# Patient Record
Sex: FEMALE | Race: WHITE | HISPANIC OR LATINO | Employment: FULL TIME | URBAN - METROPOLITAN AREA
[De-identification: names, ages, dates, MRNs, and addresses within clinical notes are randomized per-mention and may not be internally consistent; named-entity substitution may affect disease eponyms.]

---

## 2017-07-05 RX ORDER — LEVOTHYROXINE SODIUM 150 MCG
TABLET ORAL
Qty: 15 TABLET | Refills: 0 | Status: SHIPPED | OUTPATIENT
Start: 2017-07-05 | End: 2018-08-22 | Stop reason: SDUPTHER

## 2017-07-10 RX ORDER — LEVOTHYROXINE SODIUM 150 MCG
150 TABLET ORAL DAILY
Qty: 30 TABLET | Refills: 0 | Status: SHIPPED | OUTPATIENT
Start: 2017-07-10 | End: 2017-07-26 | Stop reason: SDUPTHER

## 2017-07-25 ENCOUNTER — TELEPHONE (OUTPATIENT)
Dept: FAMILY MEDICINE CLINIC | Facility: CLINIC | Age: 47
End: 2017-07-25

## 2017-07-26 RX ORDER — LEVOTHYROXINE SODIUM 150 MCG
150 TABLET ORAL DAILY
Qty: 30 TABLET | Refills: 0 | Status: SHIPPED | OUTPATIENT
Start: 2017-07-26 | End: 2017-08-24

## 2017-07-26 RX ORDER — LEVOTHYROXINE SODIUM 150 MCG
TABLET ORAL
Qty: 15 TABLET | Refills: 0 | OUTPATIENT
Start: 2017-07-26

## 2017-08-24 ENCOUNTER — OFFICE VISIT (OUTPATIENT)
Dept: FAMILY MEDICINE CLINIC | Facility: CLINIC | Age: 47
End: 2017-08-24

## 2017-08-24 VITALS
OXYGEN SATURATION: 98 % | DIASTOLIC BLOOD PRESSURE: 88 MMHG | BODY MASS INDEX: 40.74 KG/M2 | HEART RATE: 80 BPM | TEMPERATURE: 98 F | SYSTOLIC BLOOD PRESSURE: 140 MMHG | WEIGHT: 230 LBS

## 2017-08-24 DIAGNOSIS — R03.0 PREHYPERTENSION: ICD-10-CM

## 2017-08-24 DIAGNOSIS — E78.2 MIXED HYPERLIPIDEMIA: ICD-10-CM

## 2017-08-24 DIAGNOSIS — E03.9 ADULT HYPOTHYROIDISM: Primary | ICD-10-CM

## 2017-08-24 DIAGNOSIS — J30.1 ALLERGIC RHINITIS DUE TO POLLEN, UNSPECIFIED RHINITIS SEASONALITY: ICD-10-CM

## 2017-08-24 PROCEDURE — 99214 OFFICE O/P EST MOD 30 MIN: CPT | Performed by: FAMILY MEDICINE

## 2017-08-24 RX ORDER — FEXOFENADINE HCL 180 MG/1
180 TABLET ORAL DAILY
Qty: 30 TABLET | Refills: 11 | Status: SHIPPED | OUTPATIENT
Start: 2017-08-24 | End: 2018-09-27 | Stop reason: SDUPTHER

## 2017-08-24 NOTE — PROGRESS NOTES
Subjective   Liz Salvador is a 47 y.o. female. Presents today for   Chief Complaint   Patient presents with   • Follow-up     med check and thyroid check and blood pressures running high.   • Hypothyroidism   • Hyperlipidemia     Here for chronic disease management and above.    Hypothyroidism   This is a chronic problem. The current episode started more than 1 year ago. The problem occurs constantly. The problem has been unchanged. Associated symptoms include congestion and coughing (in am). Pertinent negatives include no abdominal pain, change in bowel habit, chest pain, fatigue, headaches, myalgias, nausea, numbness, visual change, vomiting or weakness. Associated symptoms comments: Paitent BP borderline at ov;  On medication remote past, but too low;  Reports bad experience at hospital when child and very anxious when sees physician (white coat syndrome).  Denies family hx of high blood pressure.    Reports that anytime eats has to go to restroom since choley.. Nothing aggravates the symptoms. Treatments tried: levothyroxine. Improvement on treatment: Due for recheck.   Hyperlipidemia   This is a chronic problem. The current episode started more than 1 year ago. Condition status: due for recheck, not on tx. Recent lipid tests were reviewed and are high. Exacerbating diseases include hypothyroidism. There are no known factors aggravating her hyperlipidemia. Pertinent negatives include no chest pain, focal sensory loss, focal weakness, leg pain, myalgias or shortness of breath. Current antihyperlipidemic treatment includes diet change. Improvement on treatment: due for recheck. There are no compliance problems.  Risk factors for coronary artery disease include dyslipidemia and obesity (hypothyroidism).     Hx of allergies, needs refills as starting back up.  This past May had episode of vertigo, has resolved;       Review of Systems   Constitutional: Negative for fatigue. Unexpected weight change: Weight  gain or loss.   HENT: Positive for congestion and sneezing.    Eyes: Positive for itching.   Respiratory: Positive for cough (in am). Negative for shortness of breath and wheezing.    Cardiovascular: Negative for chest pain, palpitations and leg swelling.   Gastrointestinal: Positive for diarrhea. Negative for abdominal pain, change in bowel habit, nausea and vomiting.   Musculoskeletal: Negative for myalgias.   Allergic/Immunologic: Positive for environmental allergies.   Neurological: Negative for dizziness, tremors, focal weakness, syncope, facial asymmetry, speech difficulty, weakness, light-headedness, numbness and headaches.       The following portions of the patient's history were reviewed and updated as appropriate: allergies, current medications, past family history, past medical history and problem list.    Patient Active Problem List   Diagnosis   • Adult hypothyroidism   • Vitamin D deficiency   • Mixed hyperlipidemia   • Prehypertension       Allergies   Allergen Reactions   • No Known Drug Allergy        Current Outpatient Prescriptions on File Prior to Visit   Medication Sig Dispense Refill   • azelastine (ASTELIN) 0.1 % nasal spray 2 sprays into each nostril 2 (two) times a day. Use in each nostril as directed 30 mL 12   • SYNTHROID 150 MCG tablet TAKE ONE TABLET BY MOUTH DAILY AS DIRECTED 15 tablet 0   • [DISCONTINUED] fexofenadine (ALLEGRA) 180 MG tablet Take 1 tablet by mouth daily. 30 tablet 11   • [DISCONTINUED] SYNTHROID 150 MCG tablet Take 1 tablet by mouth Daily. - Due for an appt 30 tablet 0     No current facility-administered medications on file prior to visit.        Objective   Vitals:    08/24/17 0848   BP: 140/88   Pulse: 80   Temp: 98 °F (36.7 °C)   SpO2: 98%   Weight: 230 lb (104 kg)       Physical Exam   Constitutional: She appears well-developed and well-nourished.   HENT:   Head: Normocephalic and atraumatic.   Nose: Mucosal edema and rhinorrhea present.   Mouth/Throat: Uvula is  midline, oropharynx is clear and moist and mucous membranes are normal.   +PND   Neck: Neck supple. No JVD present. No thyromegaly present.   Cardiovascular: Normal rate, regular rhythm and normal heart sounds.  Exam reveals no gallop and no friction rub.    No murmur heard.  Pulmonary/Chest: Effort normal and breath sounds normal. No respiratory distress. She has no wheezes. She has no rales.   Abdominal: Soft. Bowel sounds are normal. She exhibits no distension. There is no tenderness. There is no rebound and no guarding.   Musculoskeletal: She exhibits no edema.   Neurological: She is alert.   Skin: Skin is warm and dry.   Psychiatric: She has a normal mood and affect. Her behavior is normal.   Nursing note and vitals reviewed.      Assessment/Plan   Liz was seen today for follow-up, hypothyroidism and hyperlipidemia.    Diagnoses and all orders for this visit:    Adult hypothyroidism  -     TSH  -     T4, Free  -     T3, Free    Allergic rhinitis due to pollen, unspecified rhinitis seasonality  -     fexofenadine (ALLEGRA) 180 MG tablet; Take 1 tablet by mouth Daily.    Mixed hyperlipidemia  -     Comprehensive Metabolic Panel  -     Lipid Panel    Prehypertension    -bp borderline - recommend lifestyle changes, follow low Na diet, she is avoiding salt;  Recommend regular exercise and heart healthy diet.  Keep BP log and check once daily  -HLD - crecheck lipids.  -allergies - restart allergy medications;  Using flonase daily  -hypothyroidism - continue medication, recheck thyroid labs.           -Follow up: 6 months       Current Outpatient Prescriptions:   •  azelastine (ASTELIN) 0.1 % nasal spray, 2 sprays into each nostril 2 (two) times a day. Use in each nostril as directed, Disp: 30 mL, Rfl: 12  •  fexofenadine (ALLEGRA) 180 MG tablet, Take 1 tablet by mouth Daily., Disp: 30 tablet, Rfl: 11  •  SYNTHROID 150 MCG tablet, TAKE ONE TABLET BY MOUTH DAILY AS DIRECTED, Disp: 15 tablet, Rfl: 0

## 2017-08-25 LAB
ALBUMIN SERPL-MCNC: 4.3 G/DL (ref 3.5–5.2)
ALBUMIN/GLOB SERPL: 1.4 G/DL
ALP SERPL-CCNC: 116 U/L (ref 39–117)
ALT SERPL-CCNC: 14 U/L (ref 1–33)
AST SERPL-CCNC: 11 U/L (ref 1–32)
BILIRUB SERPL-MCNC: 0.6 MG/DL (ref 0.1–1.2)
BUN SERPL-MCNC: 11 MG/DL (ref 6–20)
BUN/CREAT SERPL: 12.6 (ref 7–25)
CALCIUM SERPL-MCNC: 9.8 MG/DL (ref 8.6–10.5)
CHLORIDE SERPL-SCNC: 102 MMOL/L (ref 98–107)
CHOLEST SERPL-MCNC: 242 MG/DL (ref 0–200)
CO2 SERPL-SCNC: 27.1 MMOL/L (ref 22–29)
CREAT SERPL-MCNC: 0.87 MG/DL (ref 0.57–1)
GLOBULIN SER CALC-MCNC: 3.1 GM/DL
GLUCOSE SERPL-MCNC: 101 MG/DL (ref 65–99)
HDLC SERPL-MCNC: 43 MG/DL (ref 40–60)
LDLC SERPL CALC-MCNC: 156 MG/DL (ref 0–100)
POTASSIUM SERPL-SCNC: 4.7 MMOL/L (ref 3.5–5.2)
PROT SERPL-MCNC: 7.4 G/DL (ref 6–8.5)
SODIUM SERPL-SCNC: 143 MMOL/L (ref 136–145)
T3FREE SERPL-MCNC: 2.7 PG/ML (ref 2–4.4)
T4 FREE SERPL-MCNC: 1.37 NG/DL (ref 0.93–1.7)
TRIGL SERPL-MCNC: 215 MG/DL (ref 0–150)
TSH SERPL DL<=0.005 MIU/L-ACNC: 1.54 MIU/ML (ref 0.27–4.2)
VLDLC SERPL CALC-MCNC: 43 MG/DL (ref 5–40)

## 2017-08-27 NOTE — PROGRESS NOTES
Call and mail copy of results to patient.  Thyroid appropriate range  Kidney and liver function normal  Choleterol mildly elevated, work on diet

## 2017-09-06 DIAGNOSIS — J30.1 ALLERGIC RHINITIS DUE TO POLLEN: ICD-10-CM

## 2017-09-07 RX ORDER — FEXOFENADINE HCL 180 MG/1
TABLET ORAL
Qty: 30 TABLET | Refills: 10 | Status: SHIPPED | OUTPATIENT
Start: 2017-09-07 | End: 2018-08-30 | Stop reason: SDUPTHER

## 2017-09-15 ENCOUNTER — OFFICE VISIT (OUTPATIENT)
Dept: FAMILY MEDICINE CLINIC | Facility: CLINIC | Age: 47
End: 2017-09-15

## 2017-09-15 VITALS
OXYGEN SATURATION: 99 % | WEIGHT: 229 LBS | DIASTOLIC BLOOD PRESSURE: 82 MMHG | HEART RATE: 109 BPM | HEIGHT: 63 IN | TEMPERATURE: 97.7 F | BODY MASS INDEX: 40.57 KG/M2 | SYSTOLIC BLOOD PRESSURE: 132 MMHG

## 2017-09-15 DIAGNOSIS — M25.561 ACUTE PAIN OF RIGHT KNEE: Primary | ICD-10-CM

## 2017-09-15 PROCEDURE — 99213 OFFICE O/P EST LOW 20 MIN: CPT | Performed by: NURSE PRACTITIONER

## 2017-09-15 RX ORDER — DICLOFENAC SODIUM 75 MG/1
75 TABLET, DELAYED RELEASE ORAL 2 TIMES DAILY
Qty: 60 TABLET | Refills: 1 | Status: SHIPPED | OUTPATIENT
Start: 2017-09-15 | End: 2018-08-30

## 2017-09-15 NOTE — PROGRESS NOTES
Subjective   Liz Salvador is a 47 y.o. female. She has R knee pain and edema and states yesterday she could not put weight on her leg.  She has been taking diclofenac 75 mg that she was given in the past and does think it's a little better today. She was very active in sports and gymnastics when she was younger. She always has trouble with her knees but most of the time it lasts only a couple of days and then goes away. She noticed that on Wed the right one was really getting bad. She was not even about to bear weight to walk without something to lean on. It's never been this bad before. She has no known injury but has been working a lot lately standing on concrete floors, twisting and carrying heavy boxes. Thinks this might have aggravated the knee pain. She knows that having excess weight is not helping the problems but is working on that. She does try to be active by walking, hiking etc.     Knee Pain    The incident occurred more than 1 week ago. The incident occurred at home. There was no injury mechanism. The pain is present in the right knee. Associated symptoms include an inability to bear weight. The symptoms are aggravated by weight bearing. She has tried NSAIDs and ice for the symptoms. The treatment provided moderate relief.        The following portions of the patient's history were reviewed and updated as appropriate: allergies, current medications, past medical history, past social history, past surgical history and problem list.    Review of Systems   Constitutional: Negative.    Respiratory: Negative.    Cardiovascular: Negative.    Musculoskeletal: Positive for arthralgias and gait problem.       Objective   Physical Exam   Constitutional: Vital signs are normal. She appears well-developed and well-nourished. She is cooperative.   Cardiovascular: Normal rate, regular rhythm and normal heart sounds.    Pulmonary/Chest: Effort normal and breath sounds normal.   Musculoskeletal:        Right  "knee: She exhibits decreased range of motion, swelling and effusion. She exhibits no LCL laxity, normal patellar mobility, normal meniscus and no MCL laxity. No medial joint line, no lateral joint line, no MCL and no LCL tenderness noted.   Neurological: She is alert.   Nursing note and vitals reviewed.    Vitals:    09/15/17 1128   BP: 132/82   Pulse: 109   Temp: 97.7 °F (36.5 °C)   TempSrc: Oral   SpO2: 99%   Weight: 229 lb (104 kg)   Height: 63\" (160 cm)       Assessment/Plan   Diagnoses and all orders for this visit:    Acute pain of right knee  -     diclofenac (VOLTAREN) 75 MG EC tablet; Take 1 tablet by mouth 2 (Two) Times a Day.    Ice to knees  Quad strengthening exercises. Start with sets of 10 and advance to 15 TID. No running, careful with hiking. No treadmill but elipitcal ok. Swimming good.  NSAID's x 2 weeks. If not improved will get MRI.  RTC if worse           "

## 2017-09-26 RX ORDER — LEVOTHYROXINE SODIUM 150 MCG
150 TABLET ORAL DAILY
Qty: 30 TABLET | Refills: 5 | Status: SHIPPED | OUTPATIENT
Start: 2017-09-26 | End: 2018-04-04 | Stop reason: SDUPTHER

## 2018-04-04 RX ORDER — LEVOTHYROXINE SODIUM 150 MCG
TABLET ORAL
Qty: 30 TABLET | Refills: 4 | Status: SHIPPED | OUTPATIENT
Start: 2018-04-04 | End: 2018-08-30 | Stop reason: SDUPTHER

## 2018-08-17 RX ORDER — LEVOTHYROXINE SODIUM 150 MCG
TABLET ORAL
Qty: 22 TABLET | Refills: 3 | OUTPATIENT
Start: 2018-08-17

## 2018-08-22 ENCOUNTER — TELEPHONE (OUTPATIENT)
Dept: FAMILY MEDICINE CLINIC | Facility: CLINIC | Age: 48
End: 2018-08-22

## 2018-08-22 RX ORDER — LEVOTHYROXINE SODIUM 150 MCG
150 TABLET ORAL DAILY
Qty: 30 TABLET | Refills: 0 | Status: SHIPPED | OUTPATIENT
Start: 2018-08-22 | End: 2018-09-17 | Stop reason: SDUPTHER

## 2018-08-30 ENCOUNTER — OFFICE VISIT (OUTPATIENT)
Dept: FAMILY MEDICINE CLINIC | Facility: CLINIC | Age: 48
End: 2018-08-30

## 2018-08-30 VITALS
SYSTOLIC BLOOD PRESSURE: 128 MMHG | DIASTOLIC BLOOD PRESSURE: 86 MMHG | HEIGHT: 63 IN | WEIGHT: 232 LBS | TEMPERATURE: 98 F | OXYGEN SATURATION: 99 % | BODY MASS INDEX: 41.11 KG/M2 | HEART RATE: 82 BPM

## 2018-08-30 DIAGNOSIS — E03.9 ADULT HYPOTHYROIDISM: ICD-10-CM

## 2018-08-30 DIAGNOSIS — R03.0 PREHYPERTENSION: ICD-10-CM

## 2018-08-30 DIAGNOSIS — E78.2 MIXED HYPERLIPIDEMIA: Primary | ICD-10-CM

## 2018-08-30 PROCEDURE — 99213 OFFICE O/P EST LOW 20 MIN: CPT | Performed by: FAMILY MEDICINE

## 2018-08-30 RX ORDER — FLUTICASONE PROPIONATE 50 MCG
2 SPRAY, SUSPENSION (ML) NASAL DAILY
COMMUNITY
End: 2019-02-07

## 2018-08-30 NOTE — PROGRESS NOTES
Subjective   Liz Salvador is a 48 y.o. female. Presents today for   Chief Complaint   Patient presents with   • Hypothyroidism     pt here for med review and labs       Hypothyroidism   This is a chronic problem. The current episode started more than 1 year ago. The problem occurs constantly. Pertinent negatives include no abdominal pain, change in bowel habit, chest pain or fatigue. Nothing aggravates the symptoms. Treatments tried: on levothyroxine. The treatment provided moderate relief.     Hx of borderline bp;   Lipids elevated, due for recheck.    Having allergies. Hx of vertigo related to ETD, doing ok on allergy medication.    Sees Gynecology for well woman exams;  Last period over 1 year now.  Had c-scope completed.    Review of Systems   Constitutional: Negative for fatigue.   Cardiovascular: Negative for chest pain.   Gastrointestinal: Negative for abdominal pain and change in bowel habit.       Patient Active Problem List   Diagnosis   • Adult hypothyroidism   • Vitamin D deficiency   • Mixed hyperlipidemia   • Prehypertension       Social History     Social History   • Marital status:      Social History Main Topics   • Smoking status: Never Smoker   • Smokeless tobacco: Never Used   • Alcohol use No   • Drug use: No     Other Topics Concern   • Not on file       Allergies   Allergen Reactions   • No Known Drug Allergy        Current Outpatient Prescriptions on File Prior to Visit   Medication Sig Dispense Refill   • fexofenadine (ALLEGRA) 180 MG tablet Take 1 tablet by mouth Daily. 30 tablet 11   • SYNTHROID 150 MCG tablet Take 1 tablet by mouth Daily. as directed 30 tablet 0   • [DISCONTINUED] azelastine (ASTELIN) 0.1 % nasal spray 2 sprays into each nostril 2 (two) times a day. Use in each nostril as directed 30 mL 12   • [DISCONTINUED] diclofenac (VOLTAREN) 75 MG EC tablet Take 1 tablet by mouth 2 (Two) Times a Day. 60 tablet 1   • [DISCONTINUED] fexofenadine (ALLEGRA) 180 MG tablet  "TAKE ONE TABLET BY MOUTH DAILY 30 tablet 10   • [DISCONTINUED] SYNTHROID 150 MCG tablet TAKE ONE TABLET BY MOUTH DAILY 30 tablet 4     No current facility-administered medications on file prior to visit.        Objective   Vitals:    08/30/18 1651   BP: 128/86   BP Location: Right arm   Patient Position: Sitting   Cuff Size: Large Adult   Pulse: 82   Temp: 98 °F (36.7 °C)   TempSrc: Oral   SpO2: 99%   Weight: 105 kg (232 lb)   Height: 160 cm (62.99\")       Physical Exam   Constitutional: She appears well-developed and well-nourished.   HENT:   Head: Normocephalic and atraumatic.   Right Ear: Tympanic membrane and external ear normal.   Left Ear: Tympanic membrane and external ear normal.   Nose: Nose normal.   Mouth/Throat: Oropharynx is clear and moist.   Neck: Neck supple. No JVD present. No thyromegaly present.   Cardiovascular: Normal rate, regular rhythm and normal heart sounds.  Exam reveals no gallop and no friction rub.    No murmur heard.  Pulmonary/Chest: Effort normal and breath sounds normal. No respiratory distress. She has no wheezes. She has no rales.   Abdominal: Soft. Bowel sounds are normal. She exhibits no distension. There is no tenderness. There is no rebound and no guarding.   Musculoskeletal: She exhibits no edema.   Neurological: She is alert.   Skin: Skin is warm and dry.   Psychiatric: She has a normal mood and affect. Her behavior is normal.   Nursing note and vitals reviewed.      Assessment/Plan   Liz was seen today for hypothyroidism.    Diagnoses and all orders for this visit:    Mixed hyperlipidemia  -     Comprehensive Metabolic Panel  -     Lipid Panel  -     TSH    Adult hypothyroidism  -     Comprehensive Metabolic Panel  -     Lipid Panel  -     TSH    Prehypertension  -     Comprehensive Metabolic Panel  -     Lipid Panel  -     TSH    -hypothyroidism - continue medication, recheck thyroid labs.             -Follow up: 12 months and prn  "

## 2018-09-05 DIAGNOSIS — E03.9 ADULT HYPOTHYROIDISM: Primary | ICD-10-CM

## 2018-09-17 ENCOUNTER — TELEPHONE (OUTPATIENT)
Dept: FAMILY MEDICINE CLINIC | Facility: CLINIC | Age: 48
End: 2018-09-17

## 2018-09-17 RX ORDER — LEVOTHYROXINE SODIUM 150 MCG
TABLET ORAL
Qty: 30 TABLET | Refills: 6 | Status: SHIPPED | OUTPATIENT
Start: 2018-09-17 | End: 2018-10-25 | Stop reason: SDUPTHER

## 2018-09-27 ENCOUNTER — TELEPHONE (OUTPATIENT)
Dept: FAMILY MEDICINE CLINIC | Facility: CLINIC | Age: 48
End: 2018-09-27

## 2018-09-27 DIAGNOSIS — J30.1 ALLERGIC RHINITIS DUE TO POLLEN: ICD-10-CM

## 2018-09-27 RX ORDER — FEXOFENADINE HCL 180 MG/1
TABLET ORAL
Qty: 30 TABLET | Refills: 5 | Status: SHIPPED | OUTPATIENT
Start: 2018-09-27 | End: 2018-09-27 | Stop reason: SDUPTHER

## 2018-09-27 RX ORDER — FEXOFENADINE HCL 180 MG/1
180 TABLET ORAL DAILY
Qty: 30 TABLET | Refills: 5 | Status: SHIPPED | OUTPATIENT
Start: 2018-09-27

## 2018-09-28 LAB
T3FREE SERPL-MCNC: 2.4 PG/ML (ref 2–4.4)
T4 FREE SERPL-MCNC: 1.49 NG/DL (ref 0.93–1.7)
TSH SERPL DL<=0.005 MIU/L-ACNC: 0.44 MIU/ML (ref 0.27–4.2)

## 2018-10-25 ENCOUNTER — TELEPHONE (OUTPATIENT)
Dept: FAMILY MEDICINE CLINIC | Facility: CLINIC | Age: 48
End: 2018-10-25

## 2018-10-25 RX ORDER — LEVOTHYROXINE SODIUM 150 MCG
150 TABLET ORAL DAILY
Qty: 30 TABLET | Refills: 6 | Status: SHIPPED | OUTPATIENT
Start: 2018-10-25 | End: 2019-05-27 | Stop reason: SDUPTHER

## 2019-02-07 ENCOUNTER — OFFICE VISIT (OUTPATIENT)
Dept: FAMILY MEDICINE CLINIC | Facility: CLINIC | Age: 49
End: 2019-02-07

## 2019-02-07 VITALS
BODY MASS INDEX: 40.57 KG/M2 | HEART RATE: 84 BPM | SYSTOLIC BLOOD PRESSURE: 148 MMHG | HEIGHT: 63 IN | DIASTOLIC BLOOD PRESSURE: 84 MMHG | TEMPERATURE: 98.1 F | OXYGEN SATURATION: 98 % | WEIGHT: 229 LBS

## 2019-02-07 DIAGNOSIS — M54.41 ACUTE RIGHT-SIDED LOW BACK PAIN WITH RIGHT-SIDED SCIATICA: Primary | ICD-10-CM

## 2019-02-07 PROCEDURE — 99213 OFFICE O/P EST LOW 20 MIN: CPT | Performed by: NURSE PRACTITIONER

## 2019-02-07 RX ORDER — NAPROXEN 500 MG/1
TABLET ORAL
Refills: 0 | COMMUNITY
Start: 2019-02-03 | End: 2019-09-03

## 2019-02-07 RX ORDER — METHYLPREDNISOLONE 4 MG/1
TABLET ORAL
Qty: 21 TABLET | Refills: 0 | Status: SHIPPED | OUTPATIENT
Start: 2019-02-07 | End: 2019-09-03

## 2019-02-07 RX ORDER — METHOCARBAMOL 500 MG/1
TABLET, FILM COATED ORAL
Refills: 0 | COMMUNITY
Start: 2019-02-03 | End: 2019-09-03

## 2019-02-07 NOTE — PROGRESS NOTES
"Subjective   Liz Salvador is a 48 y.o. female who presents for a follow up on pain in low back. Went to [facility] 2/3/19 and was dx with sciatica. Pain worsened and as a result was treated in ER the same night. Has completed steroids and is taking methocarbamol/naproxen.     History of Present Illness   At onset of flare reports low back pain was associated with stiffness after sitting on a hard bleacher. Went for a nap and woke with severe R low back pain and pain into the R upper leg radiating to the knee. Initially could not stand straight. Got 2 shots at [facility], not that helpful. So went to ER, was given muscle relaxer and got relief (robaxin). Finished steroids 2 days ago, and gradual return of RLE abnormal sensation. Today the numbness a little better but R outer hip is hurting. Frustrated with level of pain. Was using heat, not much relief, but cold really did help.     Does admit to a history of R sided low back pain flares, but never with sciatica before. These would typically settle with moving around, NSAIDS, none sustained.   The following portions of the patient's history were reviewed and updated as appropriate: allergies, current medications, past family history, past medical history, past social history, past surgical history and problem list.    Review of Systems   Constitutional: Positive for activity change. Negative for chills and fever.   Gastrointestinal: Negative for constipation (no bowel or bladder changes) and diarrhea.   Genitourinary: Negative for urinary incontinence.   Musculoskeletal: Positive for arthralgias and back pain. Negative for gait problem.   Neurological: Negative for weakness and numbness.   Psychiatric/Behavioral: Negative.      /84   Pulse 84   Temp 98.1 °F (36.7 °C) (Oral)   Ht 160 cm (62.99\")   Wt 104 kg (229 lb)   LMP 02/14/2016   SpO2 98%   BMI 40.58 kg/m²     Objective   Physical Exam   Constitutional: She appears well-developed and well-nourished. No " distress.   Musculoskeletal:        Right hip: She exhibits tenderness (lateral hip).        Lumbar back: She exhibits pain and spasm. She exhibits normal range of motion and no tenderness.   SLR neg reginaldo, -SI tenderness, negative piriformis to distraction.    Neurological: No sensory deficit. She exhibits normal muscle tone. Gait normal.   Reflex Scores:       Patellar reflexes are 2+ on the right side and 2+ on the left side.       Achilles reflexes are 2+ on the right side and 2+ on the left side.  Psychiatric: She has a normal mood and affect. Her speech is normal and behavior is normal.     Assessment/Plan   Problems Addressed this Visit     None      Visit Diagnoses     Acute right-sided low back pain with right-sided sciatica    -  Primary    Relevant Medications    MethylPREDNISolone (MEDROL) 4 MG tablet    Other Relevant Orders    Ambulatory Referral to Physical Therapy Evaluate and treat        Low back pain--sciatica still flared, worsening of steroids, will rx medrol dose pack, continue robaxin for pain. HEP given, will start PT, consider imaging if not settling in 6 weeks.

## 2019-02-08 ENCOUNTER — TELEPHONE (OUTPATIENT)
Dept: FAMILY MEDICINE CLINIC | Facility: CLINIC | Age: 49
End: 2019-02-08

## 2019-02-12 ENCOUNTER — TREATMENT (OUTPATIENT)
Dept: PHYSICAL THERAPY | Facility: CLINIC | Age: 49
End: 2019-02-12

## 2019-02-12 DIAGNOSIS — M54.41 ACUTE RIGHT-SIDED LOW BACK PAIN WITH RIGHT-SIDED SCIATICA: Primary | ICD-10-CM

## 2019-02-12 PROCEDURE — 97530 THERAPEUTIC ACTIVITIES: CPT | Performed by: PHYSICAL THERAPIST

## 2019-02-12 PROCEDURE — 97161 PT EVAL LOW COMPLEX 20 MIN: CPT | Performed by: PHYSICAL THERAPIST

## 2019-02-12 PROCEDURE — 97110 THERAPEUTIC EXERCISES: CPT | Performed by: PHYSICAL THERAPIST

## 2019-02-12 NOTE — PATIENT INSTRUCTIONS
Educated about Dx and exam findings, rationale and POC. Gave handout on HEP with instructions.  Reviewed seated and sleeping postures

## 2019-02-12 NOTE — PROGRESS NOTES
Physical Therapy Initial Evaluation and Plan of Care        Subjective Evaluation    History of Present Illness  Date of onset: 2019  Mechanism of injury: Sat on hard bleachers for hours - back flared up and also sleeping on a bad mattress; Hx of prior off/on LBP with some RLE sxs; steroid shot helped initially but wore off and now on dose pack which is helping      Patient Occupation: warehouse work and purchasing mgr   Precautions and Work Restrictions: off for a week - today 1st day backPain  Current pain ratin  At best pain ratin  At worst pain rating: 10  Location: lower R back/hip and R medial thigh; some N/T ant thigh  Quality: dull ache  Relieving factors: medications and ice (pressure)  Aggravating factors: prolonged positioning and sleeping (sitting on a hard chair)  Progression: improved    Social Support  Lives in: multiple-level home  Lives with: parents    Diagnostic Tests  No diagnostic tests performed    Treatments  Previous treatment: injection treatment and medication  Current treatment: medication  Patient Goals  Patient goals for therapy: decreased pain  Patient goal: be able to sit in chairs           Objective       Postural Observations    Additional Postural Observation Details  Unremarkable; non-antalgic gait    Tenderness     Right Hip   Tenderness in the PSIS.     Neurological Testing     Sensation     Lumbar   Left   Intact: light touch    Right   Intact: light touch    Active Range of Motion     Additional Active Range of Motion Details  All WFL without pain except mild ant thigh pain with RSB and R ext    Strength/Myotome Testing     Lumbar   Left   Normal strength  Heel walk: normal  Toe walk: normal    Right   Normal strength  Heel walk: normal  Toe walk: normal    Additional Strength Details  Moderate core weakness - abs 4-/5    Tests     Lumbar   Negative repeated extension and repeated flexion.     Left   Negative passive SLR.     Right   Positive quadrant.   Negative  passive SLR.     Additional Tests Details  + R ext quadrant for thigh pain; neg Everardo; neg hip scour; neg PA glides; no notable mm tightness         Assessment & Plan     Assessment  Impairments: abnormal or restricted ROM, activity intolerance, impaired physical strength and pain with function  Assessment details: 47 yo F with acute onset R side back/hip pain with radiating sxs into thigh presents with moderate core weakness and signs/sxs strain with S/I and/or nerve compression sxs but without obvious radiculopathy.  Prognosis: good  Functional Limitations: sleeping, uncomfortable because of pain, sitting and stooping  Goals  Plan Goals: STGs (2 wks)    1. Patient demonstrates lópez for and  compliance with HEP and precautions/restrictions  2. Patient reports pain decreased to 5/10 for improved ADLs  3. Patient reports decreased sxs into RLE by 50-75%  4. Review body mechanics with ADLs/work tasks    LTGs (4 wks)    1. Patient demonstrates independence with HEP and body mechanics for prevention  2. AROM restored to pre-injury levels with min to no pain  3. Patient reports decreased sxs into RLE by %  4. Patient demonstrates ability to sit for 30 min and sleep 4 hrs to allow return to ADLs/work without restriction    Plan  Therapy options: will be seen for skilled physical therapy services  Planned modality interventions: cryotherapy, electrical stimulation/[place] stimulation, ultrasound and thermotherapy (hydrocollator packs)  Planned therapy interventions: abdominal trunk stabilization, body mechanics training, home exercise program, therapeutic activities, stretching and strengthening  Frequency: 1-2 x wk.  Duration in weeks: 4  Treatment plan discussed with: patient        Manual Therapy:    0     mins  83314;  Therapeutic Exercise:    16     mins  [id number];     Neuromuscular Brian:    0    mins  53946;    Therapeutic Activity:     10     mins  48544;         Timed Treatment:   26   mins   Total Treatment:      47   mins    PT SIGNATURE: Love Dover, PT   DATE TREATMENT INITIATED: 2/12/2019    Initial Certification  Certification Period: 5/13/2019  I certify that the therapy services are furnished while this patient is under my care.  The services outlined above are required by this patient, and will be reviewed every 90 days.     PHYSICIAN: Robyn Palacios, APRN      DATE:     Please sign and return via fax to [phone].. Thank you, [facility].

## 2019-02-21 ENCOUNTER — TREATMENT (OUTPATIENT)
Dept: PHYSICAL THERAPY | Facility: CLINIC | Age: 49
End: 2019-02-21

## 2019-02-21 DIAGNOSIS — M54.41 ACUTE RIGHT-SIDED LOW BACK PAIN WITH RIGHT-SIDED SCIATICA: Primary | ICD-10-CM

## 2019-02-21 PROCEDURE — 97110 THERAPEUTIC EXERCISES: CPT | Performed by: PHYSICAL THERAPIST

## 2019-02-21 PROCEDURE — 97530 THERAPEUTIC ACTIVITIES: CPT | Performed by: PHYSICAL THERAPIST

## 2019-02-21 NOTE — PROGRESS NOTES
Physical Therapy Daily Progress Note      Visit # 2      Subjective   Back is better but stopped exercises because having shooting pain down side of R thigh but not during ex.  Notice it with reaching up/back (extending) and with driving    Objective   See Exercise, Manual, and Modality Logs for complete treatment.       Assessment/Plan    Overall improved with increased lópez for ADLs with adaptation of ergonomic suggestions. N o c/o LE sxs with ex and reported no pain at end of session.    Re-Assess in 2 wks and proceed accordingly             Manual Therapy:    0     mins  36149;  Therapeutic Exercise:    24     mins  96750;     Neuromuscular Brian:    0    mins  61368;    Therapeutic Activity:     14     mins  47073;         Timed Treatment:   38   mins   Total Treatment:     38   mins    Love Dover PT  Physical Therapist

## 2019-05-28 RX ORDER — LEVOTHYROXINE SODIUM 150 MCG
150 TABLET ORAL DAILY
Qty: 30 TABLET | Refills: 0 | Status: SHIPPED | OUTPATIENT
Start: 2019-05-28 | End: 2019-06-24 | Stop reason: SDUPTHER

## 2019-06-24 RX ORDER — LEVOTHYROXINE SODIUM 150 MCG
150 TABLET ORAL DAILY
Qty: 30 TABLET | Refills: 1 | Status: SHIPPED | OUTPATIENT
Start: 2019-06-24 | End: 2019-08-14 | Stop reason: SDUPTHER

## 2019-08-14 ENCOUNTER — TELEPHONE (OUTPATIENT)
Dept: FAMILY MEDICINE CLINIC | Facility: CLINIC | Age: 49
End: 2019-08-14

## 2019-08-14 RX ORDER — LEVOTHYROXINE SODIUM 150 MCG
150 TABLET ORAL DAILY
Qty: 30 TABLET | Refills: 1 | Status: SHIPPED | OUTPATIENT
Start: 2019-08-14 | End: 2019-09-03 | Stop reason: SDUPTHER

## 2019-08-26 RX ORDER — LEVOTHYROXINE SODIUM 150 MCG
150 TABLET ORAL DAILY
Qty: 30 TABLET | Refills: 0 | Status: SHIPPED | OUTPATIENT
Start: 2019-08-26 | End: 2019-09-03 | Stop reason: SDUPTHER

## 2019-09-03 ENCOUNTER — OFFICE VISIT (OUTPATIENT)
Dept: FAMILY MEDICINE CLINIC | Facility: CLINIC | Age: 49
End: 2019-09-03

## 2019-09-03 VITALS
HEIGHT: 62 IN | OXYGEN SATURATION: 97 % | BODY MASS INDEX: 42.69 KG/M2 | HEART RATE: 94 BPM | DIASTOLIC BLOOD PRESSURE: 80 MMHG | SYSTOLIC BLOOD PRESSURE: 110 MMHG | WEIGHT: 232 LBS

## 2019-09-03 DIAGNOSIS — E03.9 ADULT HYPOTHYROIDISM: Primary | ICD-10-CM

## 2019-09-03 DIAGNOSIS — M25.60 JOINT STIFFNESS: ICD-10-CM

## 2019-09-03 DIAGNOSIS — E55.9 VITAMIN D DEFICIENCY: ICD-10-CM

## 2019-09-03 DIAGNOSIS — E66.01 CLASS 3 SEVERE OBESITY DUE TO EXCESS CALORIES WITH SERIOUS COMORBIDITY AND BODY MASS INDEX (BMI) OF 40.0 TO 44.9 IN ADULT (HCC): ICD-10-CM

## 2019-09-03 DIAGNOSIS — M25.40 JOINT SWELLING: ICD-10-CM

## 2019-09-03 DIAGNOSIS — E78.2 MIXED HYPERLIPIDEMIA: ICD-10-CM

## 2019-09-03 PROCEDURE — 99214 OFFICE O/P EST MOD 30 MIN: CPT | Performed by: FAMILY MEDICINE

## 2019-09-03 RX ORDER — FLUTICASONE PROPIONATE 50 MCG
2 SPRAY, SUSPENSION (ML) NASAL DAILY
COMMUNITY

## 2019-09-03 RX ORDER — LEVOTHYROXINE SODIUM 150 MCG
150 TABLET ORAL DAILY
Qty: 30 TABLET | Refills: 12 | Status: SHIPPED | OUTPATIENT
Start: 2019-09-03 | End: 2020-08-21 | Stop reason: SDUPTHER

## 2019-09-03 NOTE — PROGRESS NOTES
Subjective   Liz Salvador is a 49 y.o. female. Presents today for   Chief Complaint   Patient presents with   • Hypothyroidism       Hypothyroidism   This is a chronic problem. The current episode started more than 1 year ago. The problem occurs constantly. The problem has been unchanged. Pertinent negatives include no abdominal pain, change in bowel habit or fatigue. Treatments tried: on brand only but expensive;   The treatment provided moderate (Due for full labs) relief.   Obesity   This is a chronic problem. The problem occurs constantly. The problem has been waxing and waning. Pertinent negatives include no abdominal pain, change in bowel habit or fatigue. Treatments tried: exercise and tried several diets;  Feels frustrated, loses a little than regains.     Reports knees, shoulders, wrists and ankles swell and painful;  Occly joint stiffness;  Family hx of RA in [place].  Was a gymnist.      Hx of elevated lipids and low vitamin D  Review of Systems   Constitutional: Negative for fatigue.   Gastrointestinal: Negative for abdominal pain and change in bowel habit.       Patient Active Problem List   Diagnosis   • Adult hypothyroidism   • Vitamin D deficiency   • Mixed hyperlipidemia   • Prehypertension       Social History     Socioeconomic History   • Marital status:      Spouse name: Not on file   • Number of children: Not on file   • Years of education: Not on file   • Highest education level: Not on file   Tobacco Use   • Smoking status: Never Smoker   • Smokeless tobacco: Never Used   Substance and Sexual Activity   • Alcohol use: No   • Drug use: No       Allergies   Allergen Reactions   • No Known Drug Allergy        Current Outpatient Medications on File Prior to Visit   Medication Sig Dispense Refill   • fluticasone (FLONASE) 50 MCG/ACT nasal spray 2 sprays into the nostril(s) as directed by provider Daily.     • SYNTHROID 150 MCG tablet Take 1 tablet by mouth Daily. as directed 30 tablet 1  "  • fexofenadine (ALLEGRA) 180 MG tablet Take 1 tablet by mouth Daily. 30 tablet 5   • [DISCONTINUED] methocarbamol (ROBAXIN) 500 MG tablet TK 2 TS PO Q 6 H PRF MUSCLE SPASM  0   • [DISCONTINUED] MethylPREDNISolone (MEDROL) 4 MG tablet follow package directions 21 tablet 0   • [DISCONTINUED] naproxen (NAPROSYN) 500 MG tablet TK 1 T PO BID WC FOR 5 DAYS  0   • [DISCONTINUED] SYNTHROID 150 MCG tablet TAKE 1 TABLET BY MOUTH DAILY AS DIRECTED 30 tablet 0     No current facility-administered medications on file prior to visit.        Objective   Vitals:    09/03/19 0859   BP: 110/80   BP Location: Left arm   Patient Position: Sitting   Cuff Size: Large Adult   Pulse: 94   SpO2: 97%   Weight: 105 kg (232 lb)   Height: 157.5 cm (62\")       Physical Exam   Constitutional: She appears well-developed and well-nourished.   HENT:   Head: Normocephalic and atraumatic.   Neck: Neck supple. No JVD present. No thyromegaly present.   Cardiovascular: Normal rate, regular rhythm and normal heart sounds. Exam reveals no gallop and no friction rub.   No murmur heard.  Pulmonary/Chest: Effort normal and breath sounds normal. No respiratory distress. She has no wheezes. She has no rales.   Abdominal: Soft. Bowel sounds are normal. She exhibits no distension. There is no tenderness. There is no rebound and no guarding.   Musculoskeletal: She exhibits no edema.        Right wrist: She exhibits swelling.        Left wrist: She exhibits swelling.   Neurological: She is alert.   Skin: Skin is warm and dry.   Psychiatric: She has a normal mood and affect. Her behavior is normal.   Nursing note and vitals reviewed.      Assessment/Plan   Liz was seen today for hypothyroidism.    Diagnoses and all orders for this visit:    Adult hypothyroidism  -     SYNTHROID 150 MCG tablet; Take 1 tablet by mouth Daily. as directed  -     Cancel: T4, Free  -     Cancel: T3, Free  -     Cancel: TSH  -     Cancel: Lipid Panel  -     TSH  -     T4, Free  -  "    T3, Free  -     C-reactive Protein  -     RC With / DsDNA, RNP, Sjogrens A / B, Smith  -     Rheumatoid Factor  -     Sedimentation Rate  -     Cyclic Citrul Peptide Antibody, IgG / IgA  -     CBC & Differential  -     Comprehensive Metabolic Panel  -     Lipid Panel    Vitamin D deficiency  -     Cancel: Vitamin D 25 Hydroxy  -     TSH  -     T4, Free  -     T3, Free  -     C-reactive Protein  -     RC With / DsDNA, RNP, Sjogrens A / B, Smith  -     Rheumatoid Factor  -     Sedimentation Rate  -     Cyclic Citrul Peptide Antibody, IgG / IgA  -     CBC & Differential  -     Comprehensive Metabolic Panel  -     Lipid Panel  -     Vitamin D 25 Hydroxy    Mixed hyperlipidemia  -     Cancel: Comprehensive Metabolic Panel  -     Cancel: Lipid Panel  -     TSH  -     T4, Free  -     T3, Free  -     C-reactive Protein  -     RC With / DsDNA, RNP, Sjogrens A / B, Smith  -     Rheumatoid Factor  -     Sedimentation Rate  -     Cyclic Citrul Peptide Antibody, IgG / IgA  -     CBC & Differential  -     Comprehensive Metabolic Panel  -     Lipid Panel    Joint stiffness  -     TSH  -     T4, Free  -     T3, Free  -     C-reactive Protein  -     RC With / DsDNA, RNP, Sjogrens A / B, Smith  -     Rheumatoid Factor  -     Sedimentation Rate  -     Cyclic Citrul Peptide Antibody, IgG / IgA  -     CBC & Differential  -     Comprehensive Metabolic Panel  -     Lipid Panel    Joint swelling  -     TSH  -     T4, Free  -     T3, Free  -     C-reactive Protein  -     RC With / DsDNA, RNP, Sjogrens A / B, Smith  -     Rheumatoid Factor  -     Sedimentation Rate  -     Cyclic Citrul Peptide Antibody, IgG / IgA  -     CBC & Differential  -     Comprehensive Metabolic Panel  -     Lipid Panel    Class 3 severe obesity due to excess calories with serious comorbidity and body mass index (BMI) of 40.0 to 44.9 in adult (CMS/[facility])        -runs to br after gb;  Try metamucil twice daily as stool bulking agent;    -will screen for RA,    -due to recheck vitamin D  -recheck lipids  -hypothyroidism - continue medication, recheck thyroid labs.    Spent >25 minutes with > 50% on counseling regarding diet, exercise and weight loss.       -Follow up: 12 months and prn

## 2019-09-05 LAB
25(OH)D3+25(OH)D2 SERPL-MCNC: 18.8 NG/ML (ref 30–100)
ALBUMIN SERPL-MCNC: 4.4 G/DL (ref 3.5–5.2)
ALBUMIN/GLOB SERPL: 1.6 G/DL
ALP SERPL-CCNC: 117 U/L (ref 39–117)
ALT SERPL-CCNC: 15 U/L (ref 1–33)
ANA SER QL: POSITIVE
AST SERPL-CCNC: 14 U/L (ref 1–32)
BASOPHILS # BLD AUTO: 0.05 10*3/MM3 (ref 0–0.2)
BASOPHILS NFR BLD AUTO: 0.8 % (ref 0–1.5)
BILIRUB SERPL-MCNC: 0.6 MG/DL (ref 0.2–1.2)
BUN SERPL-MCNC: 12 MG/DL (ref 6–20)
BUN/CREAT SERPL: 14.1 (ref 7–25)
CALCIUM SERPL-MCNC: 9.5 MG/DL (ref 8.6–10.5)
CCP IGA+IGG SERPL IA-ACNC: 3 UNITS (ref 0–19)
CENTROMERE B AB SER-ACNC: <0.2 AI (ref 0–0.9)
CHLORIDE SERPL-SCNC: 102 MMOL/L (ref 98–107)
CHOLEST SERPL-MCNC: 228 MG/DL (ref 0–200)
CHROMATIN AB SERPL-ACNC: 0.4 AI (ref 0–0.9)
CO2 SERPL-SCNC: 22.9 MMOL/L (ref 22–29)
CREAT SERPL-MCNC: 0.85 MG/DL (ref 0.57–1)
CRP SERPL-MCNC: 0.97 MG/DL (ref 0–0.5)
DSDNA AB SER-ACNC: 12 IU/ML (ref 0–9)
ENA JO1 AB SER-ACNC: <0.2 AI (ref 0–0.9)
ENA RNP AB SER-ACNC: 0.2 AI (ref 0–0.9)
ENA SCL70 AB SER-ACNC: <0.2 AI (ref 0–0.9)
ENA SM AB SER-ACNC: <0.2 AI (ref 0–0.9)
ENA SS-A AB SER-ACNC: <0.2 AI (ref 0–0.9)
ENA SS-B AB SER-ACNC: <0.2 AI (ref 0–0.9)
EOSINOPHIL # BLD AUTO: 0.19 10*3/MM3 (ref 0–0.4)
EOSINOPHIL NFR BLD AUTO: 3 % (ref 0.3–6.2)
ERYTHROCYTE [DISTWIDTH] IN BLOOD BY AUTOMATED COUNT: 14.3 % (ref 12.3–15.4)
ERYTHROCYTE [SEDIMENTATION RATE] IN BLOOD BY WESTERGREN METHOD: 27 MM/HR (ref 0–30)
GLOBULIN SER CALC-MCNC: 2.7 GM/DL
GLUCOSE SERPL-MCNC: 92 MG/DL (ref 65–99)
HCT VFR BLD AUTO: 46.5 % (ref 34–46.6)
HDLC SERPL-MCNC: 45 MG/DL (ref 40–60)
HGB BLD-MCNC: 14.4 G/DL (ref 12–15.9)
IMM GRANULOCYTES # BLD AUTO: 0.02 10*3/MM3 (ref 0–0.05)
IMM GRANULOCYTES NFR BLD AUTO: 0.3 % (ref 0–0.5)
LDLC SERPL CALC-MCNC: 146 MG/DL (ref 0–100)
LYMPHOCYTES # BLD AUTO: 1.45 10*3/MM3 (ref 0.7–3.1)
LYMPHOCYTES NFR BLD AUTO: 23.2 % (ref 19.6–45.3)
Lab: ABNORMAL
MCH RBC QN AUTO: 29.1 PG (ref 26.6–33)
MCHC RBC AUTO-ENTMCNC: 31 G/DL (ref 31.5–35.7)
MCV RBC AUTO: 93.9 FL (ref 79–97)
MONOCYTES # BLD AUTO: 0.38 10*3/MM3 (ref 0.1–0.9)
MONOCYTES NFR BLD AUTO: 6.1 % (ref 5–12)
NEUTROPHILS # BLD AUTO: 4.15 10*3/MM3 (ref 1.7–7)
NEUTROPHILS NFR BLD AUTO: 66.6 % (ref 42.7–76)
NRBC BLD AUTO-RTO: 0 /100 WBC (ref 0–0.2)
PLATELET # BLD AUTO: 265 10*3/MM3 (ref 140–450)
POTASSIUM SERPL-SCNC: 4.4 MMOL/L (ref 3.5–5.2)
PROT SERPL-MCNC: 7.1 G/DL (ref 6–8.5)
RBC # BLD AUTO: 4.95 10*6/MM3 (ref 3.77–5.28)
RHEUMATOID FACT SERPL-ACNC: <10 IU/ML (ref 0–13.9)
SODIUM SERPL-SCNC: 142 MMOL/L (ref 136–145)
T3FREE SERPL-MCNC: 2.5 PG/ML (ref 2–4.4)
T4 FREE SERPL-MCNC: 1.34 NG/DL (ref 0.93–1.7)
TRIGL SERPL-MCNC: 185 MG/DL (ref 0–150)
TSH SERPL DL<=0.005 MIU/L-ACNC: 1.35 UIU/ML (ref 0.27–4.2)
VLDLC SERPL CALC-MCNC: 37 MG/DL
WBC # BLD AUTO: 6.24 10*3/MM3 (ref 3.4–10.8)

## 2019-09-08 ENCOUNTER — TELEPHONE (OUTPATIENT)
Dept: FAMILY MEDICINE CLINIC | Facility: CLINIC | Age: 49
End: 2019-09-08

## 2019-09-08 NOTE — PROGRESS NOTES
Call and mail copy of results to patient.  Patient had one autoimmune lab +;  Rest ok;  One of two inflammatory makrers elevted.  Recommend refer to rheumatology as reporting b/l symmetric joint pain with family hx of RA  Thyroid appropriate range  Cholesterol is high, work on diet and exercise.

## 2019-09-08 NOTE — TELEPHONE ENCOUNTER
See lab task, I forgot to add Vitamin D low, ergocalciferol 50,000 iu po weekly x 12 weeks, then OTC vitamin D3 4000 iu po daily.

## 2019-09-11 DIAGNOSIS — M25.60 JOINT STIFFNESS: Primary | ICD-10-CM

## 2019-09-11 DIAGNOSIS — M25.40 JOINT SWELLING: ICD-10-CM

## 2019-09-11 DIAGNOSIS — M25.50 ARTHRALGIA, UNSPECIFIED JOINT: ICD-10-CM

## 2019-09-23 RX ORDER — LEVOTHYROXINE SODIUM 150 MCG
150 TABLET ORAL DAILY
Qty: 30 TABLET | Refills: 6 | Status: SHIPPED | OUTPATIENT
Start: 2019-09-23 | End: 2020-08-21 | Stop reason: SDUPTHER

## 2019-11-27 ENCOUNTER — TELEPHONE (OUTPATIENT)
Dept: FAMILY MEDICINE CLINIC | Facility: CLINIC | Age: 49
End: 2019-11-27

## 2020-02-03 RX ORDER — LEVOTHYROXINE SODIUM 150 MCG
150 TABLET ORAL DAILY
Qty: 30 TABLET | Refills: 1 | Status: SHIPPED | OUTPATIENT
Start: 2020-02-03 | End: 2020-08-21 | Stop reason: SDUPTHER

## 2020-08-21 ENCOUNTER — TELEPHONE (OUTPATIENT)
Dept: FAMILY MEDICINE CLINIC | Facility: CLINIC | Age: 50
End: 2020-08-21

## 2020-08-21 DIAGNOSIS — E03.9 ADULT HYPOTHYROIDISM: ICD-10-CM

## 2020-08-21 RX ORDER — LEVOTHYROXINE SODIUM 150 MCG
150 TABLET ORAL DAILY
Qty: 30 TABLET | Refills: 1 | Status: SHIPPED | OUTPATIENT
Start: 2020-08-21 | End: 2020-08-26

## 2020-08-26 DIAGNOSIS — E03.9 ADULT HYPOTHYROIDISM: ICD-10-CM

## 2020-08-26 RX ORDER — LEVOTHYROXINE SODIUM 150 MCG
TABLET ORAL
Qty: 30 TABLET | Refills: 1 | Status: SHIPPED | OUTPATIENT
Start: 2020-08-26 | End: 2020-11-22

## 2020-11-20 ENCOUNTER — OFFICE VISIT (OUTPATIENT)
Dept: FAMILY MEDICINE CLINIC | Facility: CLINIC | Age: 50
End: 2020-11-20

## 2020-11-20 VITALS
DIASTOLIC BLOOD PRESSURE: 86 MMHG | SYSTOLIC BLOOD PRESSURE: 138 MMHG | OXYGEN SATURATION: 100 % | WEIGHT: 206 LBS | BODY MASS INDEX: 36.5 KG/M2 | HEART RATE: 80 BPM | HEIGHT: 63 IN

## 2020-11-20 DIAGNOSIS — E03.9 ADULT HYPOTHYROIDISM: ICD-10-CM

## 2020-11-20 DIAGNOSIS — E78.2 MIXED HYPERLIPIDEMIA: ICD-10-CM

## 2020-11-20 DIAGNOSIS — E55.9 VITAMIN D DEFICIENCY: ICD-10-CM

## 2020-11-20 DIAGNOSIS — Z00.00 WELLNESS EXAMINATION: Primary | ICD-10-CM

## 2020-11-20 PROCEDURE — 99396 PREV VISIT EST AGE 40-64: CPT | Performed by: FAMILY MEDICINE

## 2020-11-20 NOTE — PROGRESS NOTES
"Subjective   Liz Salvador is a 50 y.o. female. Presents today for   Chief Complaint   Patient presents with   • Annual Exam       History of Present Illness  Patient here for wellness visit.  Doing ok, though down turn with pandemic.  Has been working on weight, lost 24 lbs;  Having hot flashes more and more jittery, wonders if thyroid out of range.  Due cholesteorl and vitamin D check.    Review of Systems   Respiratory: Negative for shortness of breath.    Cardiovascular: Negative for chest pain.   Gastrointestinal: Negative for abdominal pain, nausea and vomiting.   Psychiatric/Behavioral: Positive for sleep disturbance. The patient is nervous/anxious.        Patient Active Problem List   Diagnosis   • Adult hypothyroidism   • Vitamin D deficiency   • Mixed hyperlipidemia   • Prehypertension       Social History     Socioeconomic History   • Marital status:      Spouse name: Not on file   • Number of children: Not on file   • Years of education: Not on file   • Highest education level: Not on file   Tobacco Use   • Smoking status: Never Smoker   • Smokeless tobacco: Never Used   Substance and Sexual Activity   • Alcohol use: No   • Drug use: No       Allergies   Allergen Reactions   • No Known Drug Allergy        Current Outpatient Medications on File Prior to Visit   Medication Sig Dispense Refill   • fexofenadine (ALLEGRA) 180 MG tablet Take 1 tablet by mouth Daily. 30 tablet 5   • fluticasone (FLONASE) 50 MCG/ACT nasal spray 2 sprays into the nostril(s) as directed by provider Daily.     • SYNTHROID 150 MCG tablet TAKE 1 TABLET BY MOUTH DAILY 30 tablet 1     No current facility-administered medications on file prior to visit.        Objective   Vitals:    11/20/20 1403   BP: 138/86   Pulse: 80   SpO2: 100%   Weight: 93.4 kg (206 lb)   Height: 160 cm (63\")   PainSc: 0-No pain     Body mass index is 36.49 kg/m².    Physical Exam  Vitals signs and nursing note reviewed.   Constitutional:       " Appearance: She is well-developed.   HENT:      Head: Normocephalic and atraumatic.   Neck:      Musculoskeletal: Neck supple.      Thyroid: No thyromegaly.      Vascular: No JVD.   Cardiovascular:      Rate and Rhythm: Normal rate and regular rhythm.      Heart sounds: Normal heart sounds. No murmur. No friction rub. No gallop.    Pulmonary:      Effort: Pulmonary effort is normal. No respiratory distress.      Breath sounds: Normal breath sounds. No wheezing or rales.   Abdominal:      General: Bowel sounds are normal. There is no distension.      Palpations: Abdomen is soft.      Tenderness: There is no abdominal tenderness. There is no guarding or rebound.   Skin:     General: Skin is warm and dry.   Neurological:      Mental Status: She is alert.   Psychiatric:         Behavior: Behavior normal.         Assessment/Plan   Diagnoses and all orders for this visit:    1. Wellness examination (Primary)    2. Adult hypothyroidism  -     TSH    3. Mixed hyperlipidemia  -     Comprehensive Metabolic Panel  -     Lipid Panel    4. Vitamin D deficiency  -     Vitamin D 25 Hydroxy    counseled on diet and exercise, she is working on it  Would like see therapist and recommend;  Covid, NTI and impact on teen children and job has been incredibly stressful;  Ex  not caring or paying attention to children and is hard to watch.  D/w thyroid could impact mood and has lost weight intentionally, will recheck as due;  D/w medicaiton but would like wait  Due check lipdis today;  Due check vitamin D as well.          -Follow up:

## 2020-11-21 LAB
25(OH)D3+25(OH)D2 SERPL-MCNC: 19 NG/ML (ref 30–100)
ALBUMIN SERPL-MCNC: 4.8 G/DL (ref 3.8–4.9)
ALBUMIN/GLOB SERPL: 1.7 {RATIO} (ref 1.2–2.2)
ALP SERPL-CCNC: 121 IU/L (ref 39–117)
ALT SERPL-CCNC: 13 IU/L (ref 0–32)
AST SERPL-CCNC: 18 IU/L (ref 0–40)
BILIRUB SERPL-MCNC: 0.9 MG/DL (ref 0–1.2)
BUN SERPL-MCNC: 16 MG/DL (ref 6–24)
BUN/CREAT SERPL: 17 (ref 9–23)
CALCIUM SERPL-MCNC: 9.7 MG/DL (ref 8.7–10.2)
CHLORIDE SERPL-SCNC: 104 MMOL/L (ref 96–106)
CHOLEST SERPL-MCNC: 256 MG/DL (ref 100–199)
CO2 SERPL-SCNC: 24 MMOL/L (ref 20–29)
CREAT SERPL-MCNC: 0.93 MG/DL (ref 0.57–1)
GLOBULIN SER CALC-MCNC: 2.9 G/DL (ref 1.5–4.5)
GLUCOSE SERPL-MCNC: 88 MG/DL (ref 65–99)
HDLC SERPL-MCNC: 52 MG/DL
LDLC SERPL CALC-MCNC: 182 MG/DL (ref 0–99)
POTASSIUM SERPL-SCNC: 5.1 MMOL/L (ref 3.5–5.2)
PROT SERPL-MCNC: 7.7 G/DL (ref 6–8.5)
SODIUM SERPL-SCNC: 143 MMOL/L (ref 134–144)
TRIGL SERPL-MCNC: 125 MG/DL (ref 0–149)
TSH SERPL DL<=0.005 MIU/L-ACNC: 0.31 UIU/ML (ref 0.45–4.5)
VLDLC SERPL CALC-MCNC: 22 MG/DL (ref 5–40)

## 2020-11-22 DIAGNOSIS — E03.9 ADULT HYPOTHYROIDISM: ICD-10-CM

## 2020-11-22 RX ORDER — LEVOTHYROXINE SODIUM 137 MCG
137 TABLET ORAL DAILY
Qty: 30 TABLET | Refills: 5 | Status: SHIPPED | OUTPATIENT
Start: 2020-11-22 | End: 2021-01-15 | Stop reason: CLARIF

## 2020-11-23 NOTE — PROGRESS NOTES
Call and mail copy of results to patient.  Thyroid slightly over replaced, decrease levothyroxine to 137mcg po daily and recheck TSH in 6 weeks.  Orders placed.   Cholesterol high continue work on diet.  Vitamin D low, start otc 4000 iu po daily

## 2021-01-15 ENCOUNTER — TELEPHONE (OUTPATIENT)
Dept: FAMILY MEDICINE CLINIC | Facility: CLINIC | Age: 51
End: 2021-01-15

## 2021-01-15 DIAGNOSIS — E03.9 ADULT HYPOTHYROIDISM: Primary | ICD-10-CM

## 2021-01-15 RX ORDER — LEVOTHYROXINE SODIUM 137 UG/1
137 TABLET ORAL DAILY
Qty: 30 TABLET | Refills: 0 | Status: SHIPPED | OUTPATIENT
Start: 2021-01-15 | End: 2021-02-15 | Stop reason: SDUPTHER

## 2021-01-16 LAB — TSH SERPL DL<=0.005 MIU/L-ACNC: 0.64 UIU/ML (ref 0.27–4.2)

## 2021-01-18 DIAGNOSIS — E03.9 ADULT HYPOTHYROIDISM: ICD-10-CM

## 2021-01-18 RX ORDER — LEVOTHYROXINE SODIUM 137 MCG
137 TABLET ORAL DAILY
Qty: 90 TABLET | Refills: 1 | Status: SHIPPED | OUTPATIENT
Start: 2021-01-18

## 2021-01-18 NOTE — TELEPHONE ENCOUNTER
Please advise if pt is going to con't same dose of her levothyroxine 137mcg based on her recent lab result?  Please send med in with refills if going to be same dose.

## 2021-01-18 NOTE — TELEPHONE ENCOUNTER
Should be brand only synthroid at 137mcg, I sent in the brand.  I think when tasked for med change, it was reordered as generic.  I sent again.  She can try generic, but would go ahead when refills do the brand only.  RRJ

## 2021-01-18 NOTE — TELEPHONE ENCOUNTER
PATIENT IS CALLING IN SHE STATES THAT SHE DID  THE GENERIC FOR HER THYROID. LEVOTHYROXINE AND SHE IS FINE WITH THAT BUT THEN RECEIVED CALL FROM PHARMACY SAYING THE SYNTHRIOD WAS READY FOR .    SHE WOULD LIKE CALLBACK TO CONFIRM WHICH OF THESE SHE IS SUPPOSED TO BE TAKING AND WHICH IS GOING OUT TO BE FILLED FOR THE YEAR.    PLEASE ADVISE    CALLBACK NUMBER IS:  [phone]

## 2021-01-19 NOTE — TELEPHONE ENCOUNTER
Per pt, the brand name is more expensive (about $30 more per mth) and is going to try the generic. Pt said Dr. Newton was the one that put her on the brand and the reason was because of variations of the generic. Also, her insurance won't pay for the brand name now. Please advise.

## 2021-01-19 NOTE — TELEPHONE ENCOUNTER
It's ok take generic and can send again generic (uncheck david).  RRJ   Post-Care Instructions: Compression for 1 night and 4 days is critical to optimize your recovery and results. Compliance with compression helps to prevent blood clots and minimizes pain, swelling, bruising, skin discoloration (staining) and the recurrence of vessels.       \nMaterials to gather for your wound care (all available over the counter)      \nCompression stockings x 2 pairs, 4 x 4 gauze, Comprilan wrap: 8 cm and 10 cm width wrap, Medical adhesive tape       \nCompression and Wound care;  Leg compression for 3 weeks is rea to your success and safety. Compression at night is only needed the first day. After that, compression is needed only during waking hours.  However, if your leg feels better with compression at night, then you may continue compression at night as tolerated.       \nAfter the sclerotherapy procedure, 2 layers compression will be placed.       \n1. On the skin, folded or flat gauze will cover the treated areas.       \n2. A compression wrap (Comprilan) will be wrapped around your leg over the gauze. Once the compression wrap is in place, a compression stocking will be worn. This two layer  compression (wrap plus stocking) should be worn for the first 24 hours if tolerated.       \n3. After 24 hours, remove all compressions and dressings and just wear the compression stockings only during waking hours.        \nYou will need to wear compression stockings for three weeks after your procedure, unless your physician instructs you otherwise.       \nActivity       \n - It is important NOT to be sitting or lying down for several hours after surgery. You may begin walking immediately after surgery. This is good for you, but take it easy.       \n - For 2 days after treatment: Avoid aerobic exercise, weight training, and all other types of exertion that increase your breathing and pulse rates.       \n - Do not get a tan for one month after sclerotherapy. Tanning increases your risk of skin discoloration.      \nBathing       \nAfter 24 hours, you may shower or bathe in tepid water, but keep the compression stocking on. Avoid immersion in hot tubs.      \nDiscomfort . For pain or discomfort:       \n - You may take acetaminophen (TylenolTM, Extra-Strength TylenolTM or DatrilTM) as directed.       \n - Do not use aspirin, products containing aspirin, or ibuprofen (for example AdvilTM or MotrinTM) for five days after your surgery, unless approved by your physician.       \n - Dietary restrictions Do not drink alcoholic beverages for two days after your sclerotherapy procedure.       \nPossible side effects following sclerotherapy  After sclerotherapy, mild swelling is expected. The injection sites may also become bruised or gray. You may also experience one or more of the following side effects, which almost always go away within one to four months:       \n - Darkening of the injected veins       \n - Brownish staining of the skin       \n - Small clotted vessels under the surface of the skin that you can feel      \n - Bruising of the injection sites       \nWhat to do about bruising  This will resolve within 2-3 weeks. If you wish the bruising to disappear sooner, then applying Arnicare cream (over the counter, health food stores) will help.       \nWhat to do if you feel small clotted vessels under the surface of the skin.      \n - Call us for a follow up appointment. These small clots can be drained through a small nick.       \n - Draining these small clots will help you heal faster and with less discoloration.      \nWhen to contact your physician       \nContact your physician if you experience any of the following:       \n - Treated areas become increasingly sore, tender, red or warm       \n - Acetaminophen does not relieve your discomfort       \n - Injection sites turn black or the skin around the site breaks down       \n - Ulceration of the injection sites       \n - You develop blisters from the tape       \n - You develop significant swelling or pain in the leg       \n - Darkening of large areas of the skin or foot Consent was obtained with risks, benefits, and alternatives discussed for the above procedures. Sclerosant Volume (Cc): 10 Disposition: Injection sites were prepped in the [facility].  Injections were done under direct visualization.  There was minimal infiltration and a good take. \\n Dressings were placed.  Patient tolerated the procedure well without any difficult or complication.  Compression stockings were placed postoperatively.  She will wear the hose as directed, call with any problems or questions and f/u in 4-6 weeks for a recheck. Price (Use Numbers Only, No Special Characters Or $): 100 Detail Level: Detailed

## 2021-02-15 DIAGNOSIS — E03.9 ADULT HYPOTHYROIDISM: ICD-10-CM

## 2021-02-15 RX ORDER — LEVOTHYROXINE SODIUM 137 UG/1
137 TABLET ORAL DAILY
Qty: 30 TABLET | Refills: 5 | Status: SHIPPED | OUTPATIENT
Start: 2021-02-15 | End: 2021-08-11

## 2021-02-15 NOTE — TELEPHONE ENCOUNTER
Caller: Liz Salvador    Relationship: Self    Best call back number: [phone]     Medication needed:   Requested Prescriptions     Pending Prescriptions Disp Refills   • levothyroxine (SYNTHROID, LEVOTHROID) 137 MCG tablet 30 tablet 0     Sig: Take 1 tablet by mouth Daily. PLEASE COME IN FOR LAB TO CHECK THYROID       When do you need the refill by: 02/15/2021, NEEDED BEFORE Friday DUE TO PATIENT BEING OUT OF TOWN.    What details did the patient provide when requesting the medication: PATIENT HAS ABOUT 3-4 DAYS OF MEDICATION LEFT. PATIENT STATES THAT HER PHARMACY ASKED FOR PRIOR-AUTHORIZATION. INSURANCE WILL COVER THE levothyroxine (SYNTHROID, LEVOTHROID) 137 MCG tablet ONLY AS THE GENERIC BRAND.     Does the patient have less than a 3 day supply:  [x] Yes  [] No    What is the patient's preferred pharmacy: [id number] [place], [place] - [address] AT [facility] [phone] [facility] [phone] FX

## 2021-03-30 ENCOUNTER — BULK ORDERING (OUTPATIENT)
Dept: CASE MANAGEMENT | Facility: OTHER | Age: 51
End: 2021-03-30

## 2021-03-30 DIAGNOSIS — Z23 IMMUNIZATION DUE: ICD-10-CM

## 2021-08-11 DIAGNOSIS — E03.9 ADULT HYPOTHYROIDISM: ICD-10-CM

## 2021-08-11 RX ORDER — LEVOTHYROXINE SODIUM 137 UG/1
137 TABLET ORAL DAILY
Qty: 30 TABLET | Refills: 5 | Status: SHIPPED | OUTPATIENT
Start: 2021-08-11 | End: 2022-02-16

## 2021-08-24 ENCOUNTER — TELEPHONE (OUTPATIENT)
Dept: FAMILY MEDICINE CLINIC | Facility: CLINIC | Age: 51
End: 2021-08-24

## 2021-08-24 DIAGNOSIS — B34.9 ACUTE VIRAL SYNDROME: Primary | ICD-10-CM

## 2021-08-24 DIAGNOSIS — Z20.822 SUSPECTED COVID-19 VIRUS INFECTION: Primary | ICD-10-CM

## 2021-08-24 LAB
EXPIRATION DATE: ABNORMAL
INTERNAL CONTROL: ABNORMAL
Lab: ABNORMAL
SARS-COV-2 AG UPPER RESP QL IA.RAPID: DETECTED

## 2021-08-24 PROCEDURE — 87426 SARSCOV CORONAVIRUS AG IA: CPT | Performed by: FAMILY MEDICINE

## 2021-08-24 RX ORDER — DEXTROMETHORPHAN HYDROBROMIDE AND PROMETHAZINE HYDROCHLORIDE 15; 6.25 MG/5ML; MG/5ML
5 SYRUP ORAL 4 TIMES DAILY PRN
Qty: 180 ML | Refills: 0 | Status: SHIPPED | OUTPATIENT
Start: 2021-08-24 | End: 2021-10-01

## 2021-08-24 RX ORDER — IBUPROFEN 800 MG/1
800 TABLET ORAL EVERY 8 HOURS PRN
Qty: 45 TABLET | Refills: 0 | Status: SHIPPED | OUTPATIENT
Start: 2021-08-24

## 2021-08-24 NOTE — TELEPHONE ENCOUNTER
Caller: Kirsten Nayak    Relationship: Mother    Best call back number: [phone]     SYMPTOMS: CHILLS, FEVER, COUGH, HEADACHE     PATIENT HAS TESTED POSITIVE FOR COVID-19 8/20 AND HAS NOT BEEN DOING WELL. REQUESTING TO HAVE MEDICATION PRESCRIBED TO HELP COMBAT SYMPTOMS     If a prescription is needed, what is your preferred pharmacy and phone number: [facility] #[id number] - [place], [place] - [address] AT [facility] [phone] [facility] [phone] FX

## 2021-08-24 NOTE — TELEPHONE ENCOUNTER
I need copy of test ASAP.  She is candidate for antibody infusion but need copy of test to order.  I have orders ready.   Also need date symptoms started.  VANCE

## 2021-08-25 ENCOUNTER — TRANSCRIBE ORDERS (OUTPATIENT)
Dept: ADMINISTRATIVE | Facility: HOSPITAL | Age: 51
End: 2021-08-25

## 2021-08-25 DIAGNOSIS — U07.1 CLINICAL DIAGNOSIS OF SEVERE ACUTE RESPIRATORY SYNDROME CORONAVIRUS 2 (SARS-COV-2) DISEASE: Primary | ICD-10-CM

## 2021-08-25 RX ORDER — METHYLPREDNISOLONE SODIUM SUCCINATE 125 MG/2ML
125 INJECTION, POWDER, LYOPHILIZED, FOR SOLUTION INTRAMUSCULAR; INTRAVENOUS AS NEEDED
OUTPATIENT
Start: 2021-08-27

## 2021-08-25 RX ORDER — SODIUM CHLORIDE 9 MG/ML
30 INJECTION, SOLUTION INTRAVENOUS ONCE
OUTPATIENT
Start: 2021-08-27

## 2021-08-25 RX ORDER — EPINEPHRINE 1 MG/ML
0.3 INJECTION, SOLUTION, CONCENTRATE INTRAVENOUS AS NEEDED
OUTPATIENT
Start: 2021-08-27

## 2021-08-25 RX ORDER — DIPHENHYDRAMINE HYDROCHLORIDE 50 MG/ML
50 INJECTION INTRAMUSCULAR; INTRAVENOUS AS NEEDED
OUTPATIENT
Start: 2021-08-27

## 2021-08-25 NOTE — PROGRESS NOTES
[facility]  Clinical Pharmacy Department     Pharmacy Consult/Review: COVID-19 Monoclonal Antibody    Liz Salvador is a 51 y.o. female presenting with mild to moderate COVID-19 symptoms and has tested positive for SARS-CoV-2.    COVID-19 Monoclonal Antibody Ordered (casirivimab/imdevimab): 8/24/21  Ordering/Consulting Provider: Dr. Sage Ferrer  Date of Confirmed SARS-CoV-2: 8/24/21  Date of Symptom Onset : 8/19/21    Allergies  Allergies as of 08/25/2021 - Reviewed 11/20/2020   Allergen Reaction Noted    No known drug allergy  04/14/2016     Microbiology  8/24: SARS-COV-2 PCR-positive (primary care office)    Assessment/Plan:    Patient is not hospitalized due to COVID-19 infection and does not require oxygen therapy or an increase in baseline oxygen flow rate due to COVID-19.   All inclusions, exclusions, and monitoring requirements listed below have been reviewed.    Patient has tested positive for SARS-CoV-2.  Patient is within 10 days of symptom onset.  Patient is not hospitalized due to COVID-19 infection.  Patient is not requiring oxygen therapy or an increase in baseline oxygen flow rate.   Patient is at high risk for progressing to severe COVID-19 and/or hospitalization as defined by having met the following criteria: BMI >/= 25, mitral valve disease.  Patient has no known hypersensitivity to any ingredient of the monoclonal antibody.  Ordering provider has documented that they obtained verbal consent and discussion of FDA EUA Fact Sheet for Patients and Caregivers (physical copy will be provided at infusion site).    Thank you for involving pharmacy in this patient's care. Please contact pharmacy with any questions or concerns.                           Delphine Cho, Pharm.D., [facility]   Clinical Staff Pharmacist  Phone Extension #[phone]  08/25/21 13:35 EDT

## 2021-08-27 ENCOUNTER — HOSPITAL ENCOUNTER (OUTPATIENT)
Dept: INFUSION THERAPY | Facility: HOSPITAL | Age: 51
Setting detail: INFUSION SERIES
Discharge: HOME | End: 2021-08-27

## 2021-10-01 ENCOUNTER — OFFICE VISIT (OUTPATIENT)
Dept: FAMILY MEDICINE CLINIC | Facility: CLINIC | Age: 51
End: 2021-10-01

## 2021-10-01 VITALS — TEMPERATURE: 101.3 F | OXYGEN SATURATION: 93 % | HEART RATE: 88 BPM

## 2021-10-01 DIAGNOSIS — Z20.822 COUGH WITH EXPOSURE TO COVID-19 VIRUS: Primary | ICD-10-CM

## 2021-10-01 DIAGNOSIS — J02.9 SORE THROAT: ICD-10-CM

## 2021-10-01 DIAGNOSIS — J20.9 ACUTE BRONCHITIS, UNSPECIFIED ORGANISM: ICD-10-CM

## 2021-10-01 DIAGNOSIS — R05.8 COUGH WITH EXPOSURE TO COVID-19 VIRUS: Primary | ICD-10-CM

## 2021-10-01 DIAGNOSIS — J03.90 TONSILLITIS WITH EXUDATE: ICD-10-CM

## 2021-10-01 LAB
EXPIRATION DATE: NORMAL
INTERNAL CONTROL: NORMAL
Lab: NORMAL
S PYO AG THROAT QL: NEGATIVE

## 2021-10-01 PROCEDURE — 87880 STREP A ASSAY W/OPTIC: CPT | Performed by: FAMILY MEDICINE

## 2021-10-01 PROCEDURE — 99213 OFFICE O/P EST LOW 20 MIN: CPT | Performed by: FAMILY MEDICINE

## 2021-10-01 RX ORDER — ALBUTEROL SULFATE 90 UG/1
2 AEROSOL, METERED RESPIRATORY (INHALATION) EVERY 4 HOURS PRN
Qty: 6.7 G | Refills: 12 | Status: SHIPPED | OUTPATIENT
Start: 2021-10-01 | End: 2022-08-18

## 2021-10-01 RX ORDER — CEFDINIR 300 MG/1
300 CAPSULE ORAL 2 TIMES DAILY
Qty: 20 CAPSULE | Refills: 0 | Status: SHIPPED | OUTPATIENT
Start: 2021-10-01 | End: 2022-08-18

## 2021-10-01 RX ORDER — METHYLPREDNISOLONE 4 MG/1
TABLET ORAL
Qty: 21 TABLET | Refills: 0 | Status: SHIPPED | OUTPATIENT
Start: 2021-10-01 | End: 2022-08-18

## 2021-10-01 RX ORDER — DEXTROMETHORPHAN HYDROBROMIDE AND PROMETHAZINE HYDROCHLORIDE 15; 6.25 MG/5ML; MG/5ML
5 SYRUP ORAL 4 TIMES DAILY PRN
Qty: 180 ML | Refills: 0 | Status: SHIPPED | OUTPATIENT
Start: 2021-10-01 | End: 2022-08-18

## 2021-10-01 NOTE — PROGRESS NOTES
Subjective   Liz Salvador is a 51 y.o. female. Presents today for   Chief Complaint   Patient presents with   • Cough     SYMPTOMS STARTED 9/24   • Sore Throat       History of Present Illness  Patient with cough, congestion and wheezing;  Mild dyspnea;  No f/c;  No body aches;  Had covid 19 just few weeks ago.   Did not take infusion.  Has severe ST and noted exudate.      Review of Systems   Constitutional: Positive for fatigue. Negative for chills and fever.   HENT: Positive for congestion and sore throat.    Respiratory: Positive for cough and wheezing.        Patient Active Problem List   Diagnosis   • Adult hypothyroidism   • Vitamin D deficiency   • Mixed hyperlipidemia   • Prehypertension   • Clinical diagnosis of COVID-19       Social History     Socioeconomic History   • Marital status:      Spouse name: Not on file   • Number of children: Not on file   • Years of education: Not on file   • Highest education level: Not on file   Tobacco Use   • Smoking status: Never Smoker   • Smokeless tobacco: Never Used   Substance and Sexual Activity   • Alcohol use: No   • Drug use: No       Allergies   Allergen Reactions   • No Known Drug Allergy        Current Outpatient Medications on File Prior to Visit   Medication Sig Dispense Refill   • fexofenadine (ALLEGRA) 180 MG tablet Take 1 tablet by mouth Daily. 30 tablet 5   • fluticasone (FLONASE) 50 MCG/ACT nasal spray 2 sprays into the nostril(s) as directed by provider Daily.     • ibuprofen (ADVIL,MOTRIN) 800 MG tablet Take 1 tablet by mouth Every 8 (Eight) Hours As Needed for Mild Pain , Moderate Pain  or Fever. 45 tablet 0   • levothyroxine (SYNTHROID, LEVOTHROID) 137 MCG tablet TAKE 1 TABLET BY MOUTH DAILY 30 tablet 5   • Synthroid 137 MCG tablet Take 1 tablet by mouth Daily. 90 tablet 1   • [DISCONTINUED] promethazine-dextromethorphan (PROMETHAZINE-DM) 6.25-15 MG/5ML syrup Take 5 mL by mouth 4 (Four) Times a Day As Needed for Cough. 180 mL 0      No current facility-administered medications on file prior to visit.       Objective   Vitals:    10/01/21 1335   Pulse: 88   Temp: (!) 101.3 °F (38.5 °C)   TempSrc: Temporal   SpO2: 93%     There is no height or weight on file to calculate BMI.    Physical Exam  Vitals and nursing note reviewed.   Constitutional:       Appearance: She is well-developed.   HENT:      Head: Normocephalic and atraumatic.      Mouth/Throat:      Pharynx: Posterior oropharyngeal erythema present. No pharyngeal swelling or oropharyngeal exudate.      Tonsils: Tonsillar exudate present. No tonsillar abscesses. 3+ on the right. 3+ on the left.   Neck:      Thyroid: No thyromegaly.      Vascular: No JVD.   Cardiovascular:      Rate and Rhythm: Normal rate and regular rhythm.      Heart sounds: Normal heart sounds. No murmur heard.   No friction rub. No gallop.    Pulmonary:      Effort: Pulmonary effort is normal. No respiratory distress.      Breath sounds: Decreased air movement present. Decreased breath sounds present. No wheezing or rales.   Abdominal:      General: Bowel sounds are normal. There is no distension.      Palpations: Abdomen is soft.      Tenderness: There is no abdominal tenderness. There is no guarding or rebound.   Musculoskeletal:      Cervical back: Neck supple.   Skin:     General: Skin is warm and dry.   Neurological:      Mental Status: She is alert.   Psychiatric:         Behavior: Behavior normal.         RST negative    Assessment/Plan   Diagnoses and all orders for this visit:    1. Cough with exposure to COVID-19 virus (Primary)  -     COVID-19,[facility] ROUTINE, NP/OP SWAB IN TRANSPORT MEDIA OR ESWAB 72 HR TAT - Swab, Oropharynx    2. Sore throat  -     POC Rapid Strep A    3. Tonsillitis with exudate  -     methylPREDNISolone (MEDROL) 4 MG dose pack; Take as directed on package instructions.  Dispense: 21 tablet; Refill: 0  -     cefdinir (OMNICEF) 300 MG capsule; Take 1 capsule by mouth 2 (Two) Times a  Day.  Dispense: 20 capsule; Refill: 0    4. Acute bronchitis, unspecified organism  -     albuterol sulfate  (90 Base) MCG/ACT inhaler; Inhale 2 puffs Every 4 (Four) Hours As Needed for Wheezing or Shortness of Air.  Dispense: 6.7 g; Refill: 12  -     promethazine-dextromethorphan (PROMETHAZINE-DM) 6.25-15 MG/5ML syrup; Take 5 mL by mouth 4 (Four) Times a Day As Needed for Cough.  Dispense: 180 mL; Refill: 0    Just had covid 19 5 to 6 weeks ago, seems unliekly;  Has enlarged tonsils with exudate, will treat  Ok cough syrup, inhaler for cough;  ? Lingering inflammation from COVID 19, trial medrol pack and will help shrink tonsils;    Push fluids and rest       -Follow up: Prn - RTC if worse or no improvement.

## 2021-10-03 LAB
LABCORP SARS-COV-2, NAA 2 DAY TAT: NORMAL
SARS-COV-2 RNA RESP QL NAA+PROBE: NOT DETECTED

## 2021-10-08 ENCOUNTER — TELEPHONE (OUTPATIENT)
Dept: FAMILY MEDICINE CLINIC | Facility: CLINIC | Age: 51
End: 2021-10-08

## 2021-10-08 RX ORDER — FLUCONAZOLE 150 MG/1
150 TABLET ORAL ONCE
Qty: 2 TABLET | Refills: 1 | Status: SHIPPED | OUTPATIENT
Start: 2021-10-08 | End: 2021-10-08

## 2021-10-08 NOTE — TELEPHONE ENCOUNTER
PATIENT JUST FINISHED ROUND OF ANTIBIOTICS, AND HAS STARTED TO DEVELOPED YEAST IN HER PVT PARTS.  REQUESTING A PRESCRIPTION SENT TO HER LOCAL PHARMACY     [facility] #[id number] - [place], [place] - [address] AT [facility] - [phone] [facility] [phone] FX

## 2021-10-15 ENCOUNTER — TELEPHONE (OUTPATIENT)
Dept: FAMILY MEDICINE CLINIC | Facility: CLINIC | Age: 51
End: 2021-10-15

## 2021-10-15 NOTE — TELEPHONE ENCOUNTER
PT CALLED STATING THE MEDICAINE GIVEN FOR THE YEAST INFECTION IS NOT WORKING. SHE IS REQUESTING A  CALL BACK TO SEE IF THERE IS ANYTHING ELSE CAN TAKE.

## 2021-10-18 RX ORDER — FLUCONAZOLE 150 MG/1
150 TABLET ORAL DAILY
Qty: 5 TABLET | Refills: 0 | Status: SHIPPED | OUTPATIENT
Start: 2021-10-18 | End: 2022-08-18

## 2022-02-16 DIAGNOSIS — E03.9 ADULT HYPOTHYROIDISM: ICD-10-CM

## 2022-02-16 RX ORDER — LEVOTHYROXINE SODIUM 137 UG/1
137 TABLET ORAL DAILY
Qty: 30 TABLET | Refills: 0 | Status: SHIPPED | OUTPATIENT
Start: 2022-02-16 | End: 2022-02-17

## 2022-02-17 DIAGNOSIS — E03.9 ADULT HYPOTHYROIDISM: ICD-10-CM

## 2022-02-17 RX ORDER — LEVOTHYROXINE SODIUM 137 UG/1
TABLET ORAL
Qty: 90 TABLET | Refills: 0 | Status: SHIPPED | OUTPATIENT
Start: 2022-02-17 | End: 2022-05-18

## 2022-02-17 NOTE — TELEPHONE ENCOUNTER
Caller: Liz Salvador    Relationship: Self    Best call back number: [phone]    Requested Prescriptions:   Requested Prescriptions     Pending Prescriptions Disp Refills   • levothyroxine (SYNTHROID, LEVOTHROID) 137 MCG tablet [Pharmacy Med Name: LEVOTHYROXINE 0.137MG (137MCG) TAB] 90 tablet      Sig: TAKE 1 TABLET BY MOUTH DAILY        Pharmacy where request should be sent: [id number] [place], [place] - [address] [facility] AT [facility] [phone] [facility] [phone]      Additional details provided by patient: PATIENT IS NEEDING A 90 DAY SUPPLY FOR THIS REQUEST, SHE HAS JUST STARTED A NEW JOB AND CAN'T TAKE OFF.  NOV: 5/16/22  LOV: 10/01/21  Does the patient have less than a 3 day supply:  [] Yes  [x] No    Thee Fleming Rep   02/17/22 09:11 EST

## 2022-05-18 DIAGNOSIS — E03.9 ADULT HYPOTHYROIDISM: ICD-10-CM

## 2022-05-18 RX ORDER — LEVOTHYROXINE SODIUM 137 UG/1
TABLET ORAL
Qty: 90 TABLET | Refills: 0 | Status: SHIPPED | OUTPATIENT
Start: 2022-05-18 | End: 2022-08-18

## 2022-08-18 ENCOUNTER — OFFICE VISIT (OUTPATIENT)
Dept: FAMILY MEDICINE CLINIC | Facility: CLINIC | Age: 52
End: 2022-08-18

## 2022-08-18 VITALS
DIASTOLIC BLOOD PRESSURE: 74 MMHG | SYSTOLIC BLOOD PRESSURE: 130 MMHG | BODY MASS INDEX: 35.44 KG/M2 | HEIGHT: 63 IN | HEART RATE: 87 BPM | TEMPERATURE: 97.1 F | WEIGHT: 200 LBS | OXYGEN SATURATION: 99 % | RESPIRATION RATE: 18 BRPM

## 2022-08-18 DIAGNOSIS — E55.9 VITAMIN D DEFICIENCY: Primary | ICD-10-CM

## 2022-08-18 DIAGNOSIS — E55.9 VITAMIN D DEFICIENCY: ICD-10-CM

## 2022-08-18 DIAGNOSIS — E78.2 MIXED HYPERLIPIDEMIA: ICD-10-CM

## 2022-08-18 DIAGNOSIS — E03.9 ADULT HYPOTHYROIDISM: ICD-10-CM

## 2022-08-18 DIAGNOSIS — Z00.00 WELLNESS EXAMINATION: Primary | ICD-10-CM

## 2022-08-18 DIAGNOSIS — Z12.11 ENCOUNTER FOR COLORECTAL CANCER SCREENING: ICD-10-CM

## 2022-08-18 DIAGNOSIS — Z11.59 NEED FOR HEPATITIS C SCREENING TEST: ICD-10-CM

## 2022-08-18 DIAGNOSIS — Z79.899 DRUG THERAPY: ICD-10-CM

## 2022-08-18 DIAGNOSIS — Z12.12 ENCOUNTER FOR COLORECTAL CANCER SCREENING: ICD-10-CM

## 2022-08-18 DIAGNOSIS — R03.0 PREHYPERTENSION: ICD-10-CM

## 2022-08-18 PROCEDURE — 99396 PREV VISIT EST AGE 40-64: CPT | Performed by: FAMILY MEDICINE

## 2022-08-18 RX ORDER — CHOLECALCIFEROL (VITAMIN D3) 50 MCG
4000 TABLET ORAL DAILY
COMMUNITY
End: 2022-08-18

## 2022-08-18 RX ORDER — LEVOTHYROXINE SODIUM 137 UG/1
TABLET ORAL
Qty: 90 TABLET | Refills: 1 | Status: SHIPPED | OUTPATIENT
Start: 2022-08-18 | End: 2023-02-17 | Stop reason: SDUPTHER

## 2022-08-18 NOTE — PROGRESS NOTES
Subjective   Liz Salvador is a 52 y.o. female. Presents today for   Chief Complaint   Patient presents with   • Hypothyroidism   • Annual Exam       History of Present Illness  Patient here for wellness exam;  Has hypothyroidsm and due for labs today;  Doing well;      Review of Systems   Respiratory: Negative for shortness of breath.    Cardiovascular: Negative for chest pain and palpitations.       Patient Active Problem List   Diagnosis   • Adult hypothyroidism   • Vitamin D deficiency   • Mixed hyperlipidemia   • Prehypertension   • Clinical diagnosis of COVID-19       Social History     Socioeconomic History   • Marital status:    Tobacco Use   • Smoking status: Never Smoker   • Smokeless tobacco: Never Used   Vaping Use   • Vaping Use: Never used   Substance and Sexual Activity   • Alcohol use: No   • Drug use: No   • Sexual activity: Defer       Allergies   Allergen Reactions   • No Known Drug Allergy        Current Outpatient Medications on File Prior to Visit   Medication Sig Dispense Refill   • APPLE CIDER VINEGAR PO Take  by mouth.     • fexofenadine (ALLEGRA) 180 MG tablet Take 1 tablet by mouth Daily. 30 tablet 5   • fluticasone (FLONASE) 50 MCG/ACT nasal spray 2 sprays into the nostril(s) as directed by provider Daily.     • ibuprofen (ADVIL,MOTRIN) 800 MG tablet Take 1 tablet by mouth Every 8 (Eight) Hours As Needed for Mild Pain , Moderate Pain  or Fever. 45 tablet 0   • Synthroid 137 MCG tablet Take 1 tablet by mouth Daily. 90 tablet 1   • [DISCONTINUED] Cholecalciferol (Vitamin D) 50 MCG (2000 UT) tablet Take 4,000 Units by mouth Daily.     • [DISCONTINUED] albuterol sulfate  (90 Base) MCG/ACT inhaler Inhale 2 puffs Every 4 (Four) Hours As Needed for Wheezing or Shortness of Air. 6.7 g 12   • [DISCONTINUED] cefdinir (OMNICEF) 300 MG capsule Take 1 capsule by mouth 2 (Two) Times a Day. 20 capsule 0   • [DISCONTINUED] fluconazole (Diflucan) 150 MG tablet Take 1 tablet by mouth  "Daily. 5 tablet 0   • [DISCONTINUED] levothyroxine (SYNTHROID, LEVOTHROID) 137 MCG tablet TAKE 1 TABLET BY MOUTH DAILY 90 tablet 0   • [DISCONTINUED] methylPREDNISolone (MEDROL) 4 MG dose pack Take as directed on package instructions. 21 tablet 0   • [DISCONTINUED] miconazole (MICOTIN) 2 % vaginal cream Insert 1 applicator into the vagina Every Night. 60 g 2   • [DISCONTINUED] promethazine-dextromethorphan (PROMETHAZINE-DM) 6.25-15 MG/5ML syrup Take 5 mL by mouth 4 (Four) Times a Day As Needed for Cough. 180 mL 0     No current facility-administered medications on file prior to visit.       Objective   Vitals:    08/18/22 0908   BP: 130/74   Pulse: 87   Resp: 18   Temp: 97.1 °F (36.2 °C)   TempSrc: Temporal   SpO2: 99%   Weight: 90.7 kg (200 lb)   Height: 160 cm (63\")   PainSc: 0-No pain     Body mass index is 35.43 kg/m².    Physical Exam  Vitals and nursing note reviewed.   Constitutional:       Appearance: She is well-developed.   HENT:      Head: Normocephalic and atraumatic.   Neck:      Thyroid: No thyromegaly.      Vascular: No JVD.   Cardiovascular:      Rate and Rhythm: Normal rate and regular rhythm.      Heart sounds: Normal heart sounds. No murmur heard.    No friction rub. No gallop.   Pulmonary:      Effort: Pulmonary effort is normal. No respiratory distress.      Breath sounds: Normal breath sounds. No wheezing or rales.   Abdominal:      General: Bowel sounds are normal. There is no distension.      Palpations: Abdomen is soft.      Tenderness: There is no abdominal tenderness. There is no guarding or rebound.   Musculoskeletal:      Cervical back: Neck supple.   Skin:     General: Skin is warm and dry.   Neurological:      Mental Status: She is alert.   Psychiatric:         Behavior: Behavior normal.         Assessment & Plan   Diagnoses and all orders for this visit:    1. Wellness examination (Primary)    2. Adult hypothyroidism  -     TSH    3. Mixed hyperlipidemia  -     Comprehensive Metabolic " Panel  -     Lipid Panel    4. Vitamin D deficiency  -     vitamin D3 125 MCG (5000 UT) capsule capsule; Take 1 capsule by mouth Daily.  Dispense: 90 capsule; Refill: 3  -     Vitamin D 25 Hydroxy    5. Encounter for colorectal cancer screening  -     Cologuard - Stool, Per Rectum    no family hx of colon cancer; had normal 2010  Declines mammo for now as dense breasts    Counseled on diet and exercise  Due check vitamin D  Due check lkpids         -Follow up: 12 months and prn

## 2022-08-18 NOTE — PATIENT INSTRUCTIONS
"\"4-7-6-Almost None!\"  Healthy Habits Start Early    EAT 5 OR MORE SERVINGS OF VEGETABLES AND FRUITS EVERY DAY.    Help Liz get three vegetables and two fruits each day. Red, green, yellow, orange...encourage them to try all the colors so they can enjoy different flavors and get more vitamins.    How can I help Liz do this?  ---------------------------------------------  -BE PATIENT WITH Liz, remember it may take 10 times before they start to like new food. So, start with small bites and just keep trying.  -Serve at least one vegetable or fruit at every meal. Even try two. Remember, portions do not have to be as big as you think.  -Encourage eating fruits and vegetables instead of drinking them..it's a better way to get fiber and vitamins..so limit the amount of juice to 1/2 cup per day for children 1-6 years and one cup per day for children 7-18 years of age. Try using 1/2 part water and 1/2 part juice.    Spend less than two hours per day watching television and other screen media. Screen media includes video games, movies and computer use for entertainment.    How can I help Liz do this?  -Turn off the TV at dinner. Dinner is the best time to hang out with your kids and just talk, learn about their day, and tell them about your day. Your kids have a lot to learn from you and dinner is a great time to share.  " Jennifer Helton)  2019 22:13:24

## 2022-08-19 LAB
25(OH)D3+25(OH)D2 SERPL-MCNC: 38.9 NG/ML (ref 30–100)
ALBUMIN SERPL-MCNC: 4.3 G/DL (ref 3.8–4.9)
ALBUMIN/GLOB SERPL: 1.7 {RATIO} (ref 1.2–2.2)
ALP SERPL-CCNC: 115 IU/L (ref 44–121)
ALT SERPL-CCNC: 13 IU/L (ref 0–32)
AST SERPL-CCNC: 18 IU/L (ref 0–40)
BASOPHILS # BLD AUTO: 0 X10E3/UL (ref 0–0.2)
BASOPHILS NFR BLD AUTO: 1 %
BILIRUB SERPL-MCNC: 0.8 MG/DL (ref 0–1.2)
BUN SERPL-MCNC: 11 MG/DL (ref 6–24)
BUN/CREAT SERPL: 12 (ref 9–23)
CALCIUM SERPL-MCNC: 9.3 MG/DL (ref 8.7–10.2)
CHLORIDE SERPL-SCNC: 103 MMOL/L (ref 96–106)
CHOLEST SERPL-MCNC: 246 MG/DL (ref 100–199)
CO2 SERPL-SCNC: 25 MMOL/L (ref 20–29)
CREAT SERPL-MCNC: 0.94 MG/DL (ref 0.57–1)
EGFRCR-CYS SERPLBLD CKD-EPI 2021: 72 ML/MIN/1.73
EOSINOPHIL # BLD AUTO: 0.1 X10E3/UL (ref 0–0.4)
EOSINOPHIL NFR BLD AUTO: 3 %
ERYTHROCYTE [DISTWIDTH] IN BLOOD BY AUTOMATED COUNT: 13.4 % (ref 11.7–15.4)
GLOBULIN SER CALC-MCNC: 2.5 G/DL (ref 1.5–4.5)
GLUCOSE SERPL-MCNC: 91 MG/DL (ref 65–99)
HCT VFR BLD AUTO: 43.9 % (ref 34–46.6)
HCV AB S/CO SERPL IA: <0.1 S/CO RATIO (ref 0–0.9)
HDLC SERPL-MCNC: 47 MG/DL
HGB BLD-MCNC: 14.4 G/DL (ref 11.1–15.9)
IMM GRANULOCYTES # BLD AUTO: 0 X10E3/UL (ref 0–0.1)
IMM GRANULOCYTES NFR BLD AUTO: 0 %
LDLC SERPL CALC-MCNC: 164 MG/DL (ref 0–99)
LYMPHOCYTES # BLD AUTO: 1.8 X10E3/UL (ref 0.7–3.1)
LYMPHOCYTES NFR BLD AUTO: 35 %
MCH RBC QN AUTO: 30.5 PG (ref 26.6–33)
MCHC RBC AUTO-ENTMCNC: 32.8 G/DL (ref 31.5–35.7)
MCV RBC AUTO: 93 FL (ref 79–97)
MONOCYTES # BLD AUTO: 0.3 X10E3/UL (ref 0.1–0.9)
MONOCYTES NFR BLD AUTO: 6 %
NEUTROPHILS # BLD AUTO: 2.8 X10E3/UL (ref 1.4–7)
NEUTROPHILS NFR BLD AUTO: 55 %
PLATELET # BLD AUTO: 237 X10E3/UL (ref 150–450)
POTASSIUM SERPL-SCNC: 4.4 MMOL/L (ref 3.5–5.2)
PROT SERPL-MCNC: 6.8 G/DL (ref 6–8.5)
RBC # BLD AUTO: 4.72 X10E6/UL (ref 3.77–5.28)
SODIUM SERPL-SCNC: 143 MMOL/L (ref 134–144)
TRIGL SERPL-MCNC: 192 MG/DL (ref 0–149)
TSH SERPL DL<=0.005 MIU/L-ACNC: 0.62 UIU/ML (ref 0.45–4.5)
VLDLC SERPL CALC-MCNC: 35 MG/DL (ref 5–40)
WBC # BLD AUTO: 5.1 X10E3/UL (ref 3.4–10.8)

## 2023-01-13 ENCOUNTER — TELEPHONE (OUTPATIENT)
Dept: FAMILY MEDICINE CLINIC | Facility: CLINIC | Age: 53
End: 2023-01-13
Payer: COMMERCIAL

## 2023-01-13 RX ORDER — AMOXICILLIN 875 MG/1
875 TABLET, COATED ORAL 2 TIMES DAILY
Qty: 20 TABLET | Refills: 0 | Status: SHIPPED | OUTPATIENT
Start: 2023-01-13

## 2023-01-13 NOTE — TELEPHONE ENCOUNTER
Caller: Liz Salvador    Relationship to patient: Self    Best call back number: [phone]    Patient is needing: PATIENT IS CALLING TO CHECK ON THIS MEDICATION REQUEST, SHE WOULD LIKE TO HAVE THIS CALLED IN TODAY IF POSSIBLE.

## 2023-01-13 NOTE — TELEPHONE ENCOUNTER
Caller: Liz Salvador    Relationship: Self    Best call back number: [phone]    What medication are you requesting: MEDICATION TO HELP    What are your current symptoms: COUGH, SINUS CONGESTION, THICK MUCUS    How long have you been experiencing symptoms: 01/08/23    Have you had these symptoms before:    [x] Yes  [] No    Have you been treated for these symptoms before:   [x] Yes  [] No    If a prescription is needed, what is your preferred pharmacy and phone number: [id number] [address] AT [facility] [phone] [facility] [phone]      Additional notes: PATIENT WOULD LIKE A CALL OR TEXT IF SOMETHING HAS BEEN CALLED IN.    SHE STATED A VOICEMAIL IS FINE.

## 2023-02-17 DIAGNOSIS — E03.9 ADULT HYPOTHYROIDISM: ICD-10-CM

## 2023-02-17 RX ORDER — LEVOTHYROXINE SODIUM 137 UG/1
137 TABLET ORAL DAILY
Qty: 90 TABLET | Refills: 1 | Status: SHIPPED | OUTPATIENT
Start: 2023-02-17

## 2023-02-17 NOTE — TELEPHONE ENCOUNTER
Caller: Liz Salvador    Relationship: Self    Best call back number:[phone]    Requested Prescriptions:   Requested Prescriptions     Pending Prescriptions Disp Refills   • levothyroxine (SYNTHROID, LEVOTHROID) 137 MCG tablet 90 tablet 1     Sig: Take 1 tablet by mouth Daily.        Pharmacy where request should be sent: [facility] #[id number] [facility] [address] [facility] AT [facility] - [phone]  - [phone] FX     Additional details provided by patient: 5 DAYS LEFT/PHARMACY WAS SUPPOSE TO SEND REQUEST    Does the patient have less than a 3 day supply:  [] Yes  [x] No    Would you like a call back once the refill request has been completed: [] Yes [x] No    If the office needs to give you a call back, can they leave a voicemail: [] Yes [x] No    Thee Israel Rep   02/17/23 14:24 EST

## 2023-05-16 ENCOUNTER — OFFICE VISIT (OUTPATIENT)
Dept: FAMILY MEDICINE CLINIC | Facility: CLINIC | Age: 53
End: 2023-05-16
Payer: COMMERCIAL

## 2023-05-16 VITALS
BODY MASS INDEX: 37.03 KG/M2 | HEIGHT: 63 IN | SYSTOLIC BLOOD PRESSURE: 138 MMHG | OXYGEN SATURATION: 98 % | WEIGHT: 209 LBS | DIASTOLIC BLOOD PRESSURE: 95 MMHG | HEART RATE: 101 BPM

## 2023-05-16 DIAGNOSIS — E03.9 ADULT HYPOTHYROIDISM: Primary | ICD-10-CM

## 2023-05-16 DIAGNOSIS — R53.83 OTHER FATIGUE: ICD-10-CM

## 2023-05-16 DIAGNOSIS — E01.0 THYROMEGALY: ICD-10-CM

## 2023-05-16 DIAGNOSIS — M26.623 BILATERAL TEMPOROMANDIBULAR JOINT PAIN: ICD-10-CM

## 2023-05-16 DIAGNOSIS — I10 PRIMARY HYPERTENSION: ICD-10-CM

## 2023-05-16 DIAGNOSIS — F41.1 GAD (GENERALIZED ANXIETY DISORDER): ICD-10-CM

## 2023-05-16 DIAGNOSIS — M25.562 ACUTE PAIN OF LEFT KNEE: ICD-10-CM

## 2023-05-16 DIAGNOSIS — E78.2 MIXED HYPERLIPIDEMIA: ICD-10-CM

## 2023-05-16 PROCEDURE — 99214 OFFICE O/P EST MOD 30 MIN: CPT | Performed by: FAMILY MEDICINE

## 2023-05-16 RX ORDER — METHYLPREDNISOLONE 4 MG/1
TABLET ORAL
Qty: 21 TABLET | Refills: 0 | Status: SHIPPED | OUTPATIENT
Start: 2023-05-16

## 2023-05-16 NOTE — PROGRESS NOTES
Subjective   Lzi Salvador is a 52 y.o. female. Presents today for   Chief Complaint   Patient presents with   • Hypothyroidism   • Knee Pain     left   • Anxiety   • Headache   • Difficulty Swallowing       Hypothyroidism  This is a chronic problem. The current episode started more than 1 year ago. The problem occurs constantly. Associated symptoms include arthralgias, fatigue, headaches and myalgias. Associated symptoms comments: Heart fluttering and anxious. Treatments tried: on levothyrxoine.   Knee Pain   The incident occurred more than 1 week ago. Injury mechanism: lifts frequently, remote sports injuries. The pain is moderate. The pain has been fluctuating since onset. The symptoms are aggravated by movement and weight bearing. Treatments tried: given ibuprofen.   Anxiety  Presents for follow-up visit. Symptoms include decreased concentration, excessive worry and nervous/anxious behavior. Symptoms occur constantly. The severity of symptoms is severe. The quality of sleep is fair. Nighttime awakenings: several.     Compliance with medications is %.   Headache  Headache pattern: very stressed;  daughter wtih 5 kids with adhd moved in.  Difficulty Swallowing  Chronicity: feels liek nodule or goiter as swallowing feels. Associated symptoms include arthralgias, fatigue, headaches and myalgias.     patien ton bp med in past, but felt awful;   140s/100s;      Review of Systems   Constitutional: Positive for fatigue.   Musculoskeletal: Positive for arthralgias and myalgias.   Neurological: Positive for headaches.   Psychiatric/Behavioral: Positive for decreased concentration. The patient is nervous/anxious.        Patient Active Problem List   Diagnosis   • Adult hypothyroidism   • Vitamin D deficiency   • Mixed hyperlipidemia   • Prehypertension   • Clinical diagnosis of COVID-19       Social History     Socioeconomic History   • Marital status:    Tobacco Use   • Smoking status: Never   •  "Smokeless tobacco: Never   Vaping Use   • Vaping Use: Never used   Substance and Sexual Activity   • Alcohol use: No   • Drug use: No   • Sexual activity: Defer       Allergies   Allergen Reactions   • No Known Drug Allergy        Current Outpatient Medications on File Prior to Visit   Medication Sig Dispense Refill   • APPLE CIDER VINEGAR PO Take  by mouth.     • fexofenadine (ALLEGRA) 180 MG tablet Take 1 tablet by mouth Daily. 30 tablet 5   • fluticasone (FLONASE) 50 MCG/ACT nasal spray 2 sprays into the nostril(s) as directed by provider Daily.     • ibuprofen (ADVIL,MOTRIN) 600 MG tablet Take 1 tablet by mouth Every 8 (Eight) Hours As Needed for Mild Pain. 30 tablet 0   • levothyroxine (SYNTHROID, LEVOTHROID) 137 MCG tablet Take 1 tablet by mouth Daily. 90 tablet 1   • vitamin D3 125 MCG (5000 UT) capsule capsule Take 1 capsule by mouth Daily. 90 capsule 3   • [DISCONTINUED] Synthroid 137 MCG tablet Take 1 tablet by mouth Daily. (Patient not taking: Reported on 5/16/2023) 90 tablet 1     No current facility-administered medications on file prior to visit.       Objective   Vitals:    05/16/23 1423 05/16/23 1456   BP: (!) 146/102 138/95   Pulse: 101    SpO2: 98%    Weight: 94.8 kg (209 lb)    Height: 160 cm (63\")      Body mass index is 37.02 kg/m².    Physical Exam  Vitals and nursing note reviewed.   Constitutional:       Appearance: She is well-developed.   HENT:      Head: Normocephalic and atraumatic.      Right Ear: Tympanic membrane normal.      Left Ear: Tympanic membrane normal.      Mouth/Throat:      Comments: B/l TMJ crepitus;  Right subluxes  Neck:      Thyroid: Thyromegaly present.      Vascular: No JVD.   Cardiovascular:      Rate and Rhythm: Normal rate and regular rhythm.      Heart sounds: Normal heart sounds. No murmur heard.    No friction rub. No gallop.   Pulmonary:      Effort: Pulmonary effort is normal. No respiratory distress.      Breath sounds: Normal breath sounds. No wheezing or " rales.   Abdominal:      General: Bowel sounds are normal. There is no distension.      Palpations: Abdomen is soft.      Tenderness: There is no abdominal tenderness. There is no guarding or rebound.   Musculoskeletal:      Cervical back: Neck supple.   Skin:     General: Skin is warm and dry.   Neurological:      Mental Status: She is alert.   Psychiatric:         Behavior: Behavior normal.         Assessment & Plan   Diagnoses and all orders for this visit:    1. Adult hypothyroidism (Primary)  -     TSH  -     T4, Free  -     T3, Free    2. Mixed hyperlipidemia  -     Comprehensive Metabolic Panel  -     Lipid Panel    3. Other fatigue  -     Comprehensive Metabolic Panel  -     CBC & Differential  -     TSH  -     T4, Free  -     T3, Free  -     Vitamin B12    4. SANG (generalized anxiety disorder)    5. Thyromegaly  -     US Thyroid; Future    6. Acute pain of left knee  -     methylPREDNISolone (MEDROL) 4 MG dose pack; Take as directed on package instructions.  Dispense: 21 tablet; Refill: 0    7. Bilateral temporomandibular joint pain  -     methylPREDNISolone (MEDROL) 4 MG dose pack; Take as directed on package instructions.  Dispense: 21 tablet; Refill: 0    8. Primary hypertension    will check thyroid us and labs;  Would liek labs prior to other meds;    htn - declines meds will try low Na;  Work on weigh tloss;    Try steroid pack  considr med for stress, would like check thyroid prior  Due check lipids  TMJ avoid chew foods, gum;  Work on relaxation         Class 2 Severe Obesity (BMI >=35 and <=39.9). Obesity-related health conditions include the following: hypertension. Obesity is unchanged. BMI is is above average; BMI management plan is completed. We provided information on portion control and increasing exercise.     -Follow up: 2 weeks and prn

## 2023-05-17 LAB
ALBUMIN SERPL-MCNC: 4.9 G/DL (ref 3.8–4.9)
ALBUMIN/GLOB SERPL: 1.8 {RATIO} (ref 1.2–2.2)
ALP SERPL-CCNC: 111 IU/L (ref 44–121)
ALT SERPL-CCNC: 14 IU/L (ref 0–32)
AST SERPL-CCNC: 16 IU/L (ref 0–40)
BASOPHILS # BLD AUTO: 0 X10E3/UL (ref 0–0.2)
BASOPHILS NFR BLD AUTO: 1 %
BILIRUB SERPL-MCNC: 1.1 MG/DL (ref 0–1.2)
BUN SERPL-MCNC: 8 MG/DL (ref 6–24)
BUN/CREAT SERPL: 9 (ref 9–23)
CALCIUM SERPL-MCNC: 10 MG/DL (ref 8.7–10.2)
CHLORIDE SERPL-SCNC: 101 MMOL/L (ref 96–106)
CHOLEST SERPL-MCNC: 247 MG/DL (ref 100–199)
CO2 SERPL-SCNC: 24 MMOL/L (ref 20–29)
CREAT SERPL-MCNC: 0.94 MG/DL (ref 0.57–1)
EGFRCR SERPLBLD CKD-EPI 2021: 72 ML/MIN/1.73
EOSINOPHIL # BLD AUTO: 0.1 X10E3/UL (ref 0–0.4)
EOSINOPHIL NFR BLD AUTO: 1 %
ERYTHROCYTE [DISTWIDTH] IN BLOOD BY AUTOMATED COUNT: 13.3 % (ref 11.7–15.4)
GLOBULIN SER CALC-MCNC: 2.8 G/DL (ref 1.5–4.5)
GLUCOSE SERPL-MCNC: 85 MG/DL (ref 70–99)
HCT VFR BLD AUTO: 42.7 % (ref 34–46.6)
HDLC SERPL-MCNC: 52 MG/DL
HGB BLD-MCNC: 14.6 G/DL (ref 11.1–15.9)
IMM GRANULOCYTES # BLD AUTO: 0 X10E3/UL (ref 0–0.1)
IMM GRANULOCYTES NFR BLD AUTO: 0 %
LDLC SERPL CALC-MCNC: 166 MG/DL (ref 0–99)
LYMPHOCYTES # BLD AUTO: 1.8 X10E3/UL (ref 0.7–3.1)
LYMPHOCYTES NFR BLD AUTO: 28 %
MCH RBC QN AUTO: 31.3 PG (ref 26.6–33)
MCHC RBC AUTO-ENTMCNC: 34.2 G/DL (ref 31.5–35.7)
MCV RBC AUTO: 91 FL (ref 79–97)
MONOCYTES # BLD AUTO: 0.4 X10E3/UL (ref 0.1–0.9)
MONOCYTES NFR BLD AUTO: 6 %
NEUTROPHILS # BLD AUTO: 4 X10E3/UL (ref 1.4–7)
NEUTROPHILS NFR BLD AUTO: 64 %
PLATELET # BLD AUTO: 275 X10E3/UL (ref 150–450)
POTASSIUM SERPL-SCNC: 4.5 MMOL/L (ref 3.5–5.2)
PROT SERPL-MCNC: 7.7 G/DL (ref 6–8.5)
RBC # BLD AUTO: 4.67 X10E6/UL (ref 3.77–5.28)
SODIUM SERPL-SCNC: 144 MMOL/L (ref 134–144)
T3FREE SERPL-MCNC: 2.6 PG/ML (ref 2–4.4)
T4 FREE SERPL-MCNC: 1.87 NG/DL (ref 0.82–1.77)
TRIGL SERPL-MCNC: 161 MG/DL (ref 0–149)
TSH SERPL DL<=0.005 MIU/L-ACNC: 0.76 UIU/ML (ref 0.45–4.5)
VIT B12 SERPL-MCNC: 492 PG/ML (ref 232–1245)
VLDLC SERPL CALC-MCNC: 29 MG/DL (ref 5–40)
WBC # BLD AUTO: 6.2 X10E3/UL (ref 3.4–10.8)

## 2023-05-21 NOTE — PROGRESS NOTES
Call results to patient.  Thyroid is a little over replaced based on free T4,  change levothryxoine 137mcg po daily to 125mcg po daily and see if helps how feels.  Send #30 2 ref;  recheck TSH and free T4 in 6 weeks dx hypothyroidism  Blood counts, kidney and liver function normal  B12 normal  Cholesterol high, wokr on diet.

## 2023-05-22 DIAGNOSIS — E03.9 ADULT HYPOTHYROIDISM: Primary | ICD-10-CM

## 2023-05-22 RX ORDER — LEVOTHYROXINE SODIUM 0.12 MG/1
125 TABLET ORAL DAILY
COMMUNITY
End: 2023-05-22 | Stop reason: SDUPTHER

## 2023-05-22 RX ORDER — LEVOTHYROXINE SODIUM 0.12 MG/1
125 TABLET ORAL DAILY
Qty: 30 TABLET | Refills: 2 | Status: SHIPPED | OUTPATIENT
Start: 2023-05-22

## 2023-05-30 ENCOUNTER — OFFICE VISIT (OUTPATIENT)
Dept: FAMILY MEDICINE CLINIC | Facility: CLINIC | Age: 53
End: 2023-05-30

## 2023-05-30 VITALS
DIASTOLIC BLOOD PRESSURE: 90 MMHG | HEIGHT: 63 IN | OXYGEN SATURATION: 98 % | SYSTOLIC BLOOD PRESSURE: 135 MMHG | WEIGHT: 212 LBS | BODY MASS INDEX: 37.56 KG/M2 | HEART RATE: 78 BPM

## 2023-05-30 DIAGNOSIS — E03.9 ACQUIRED HYPOTHYROIDISM: Primary | ICD-10-CM

## 2023-05-30 DIAGNOSIS — R03.0 PREHYPERTENSION: ICD-10-CM

## 2023-05-30 PROCEDURE — 99213 OFFICE O/P EST LOW 20 MIN: CPT | Performed by: FAMILY MEDICINE

## 2023-05-30 NOTE — PROGRESS NOTES
Subjective   Liz Salvador is a 53 y.o. female. Presents today for   Chief Complaint   Patient presents with   • Hypothyroidism   • Hypertension       History of Present Illness  Patient 54 y/o female last ov fatigued, emotional and headaches;  Better since cutting thyroid dose;  Knees better after medrol pack;   Last ov thought headaches more TMJ.   Has had back pain past couple days;         Review of Systems   Musculoskeletal: Positive for back pain.   Neurological: Positive for headaches.       Patient Active Problem List   Diagnosis   • Adult hypothyroidism   • Vitamin D deficiency   • Mixed hyperlipidemia   • Prehypertension   • Clinical diagnosis of COVID-19       Social History     Socioeconomic History   • Marital status:    Tobacco Use   • Smoking status: Never   • Smokeless tobacco: Never   Vaping Use   • Vaping Use: Never used   Substance and Sexual Activity   • Alcohol use: No   • Drug use: No   • Sexual activity: Defer       Allergies   Allergen Reactions   • No Known Drug Allergy        Current Outpatient Medications on File Prior to Visit   Medication Sig Dispense Refill   • APPLE CIDER VINEGAR PO Take  by mouth.     • fexofenadine (ALLEGRA) 180 MG tablet Take 1 tablet by mouth Daily. 30 tablet 5   • fluticasone (FLONASE) 50 MCG/ACT nasal spray 2 sprays into the nostril(s) as directed by provider Daily.     • ibuprofen (ADVIL,MOTRIN) 600 MG tablet Take 1 tablet by mouth Every 8 (Eight) Hours As Needed for Mild Pain. 30 tablet 0   • levothyroxine (SYNTHROID, LEVOTHROID) 125 MCG tablet Take 1 tablet by mouth Daily. 30 tablet 2   • vitamin D3 125 MCG (5000 UT) capsule capsule Take 1 capsule by mouth Daily. 90 capsule 3   • [DISCONTINUED] methylPREDNISolone (MEDROL) 4 MG dose pack Take as directed on package instructions. (Patient not taking: Reported on 5/30/2023) 21 tablet 0     No current facility-administered medications on file prior to visit.       Objective   Vitals:    05/30/23 1304  "05/30/23 1319   BP: 142/82 135/90   Pulse: 78    SpO2: 98%    Weight: 96.2 kg (212 lb)    Height: 160 cm (63\")      Body mass index is 37.55 kg/m².    Physical Exam  Vitals and nursing note reviewed.   Constitutional:       Appearance: She is well-developed.   Skin:     General: Skin is warm and dry.   Neurological:      Mental Status: She is alert and oriented to person, place, and time.   Psychiatric:         Behavior: Behavior normal.         Assessment & Plan   Diagnoses and all orders for this visit:    1. Acquired hypothyroidism (Primary)    2. Prehypertension    work on weight loss;  Low Na diet;  Keep bp log  chck tsh in 6 weeks then see me 8 weeks  Discussed wegovy, will consider.            Class 2 Severe Obesity (BMI >=35 and <=39.9). Obesity-related health conditions include the following: dyslipidemias. Obesity is unchanged. BMI is is above average; BMI management plan is completed. We discussed portion control and increasing exercise.     -Follow up: 8 weeks and prn   "

## 2023-06-02 ENCOUNTER — TELEPHONE (OUTPATIENT)
Dept: FAMILY MEDICINE CLINIC | Facility: CLINIC | Age: 53
End: 2023-06-02

## 2023-06-02 RX ORDER — LEVOTHYROXINE SODIUM 112 UG/1
112 TABLET ORAL DAILY
Qty: 30 TABLET | Refills: 2 | Status: SHIPPED | OUTPATIENT
Start: 2023-06-02

## 2023-06-02 NOTE — TELEPHONE ENCOUNTER
I cut dose down further to 112mcg change to this and let me know how feeling by MOnday (call back).  RRJ

## 2023-06-02 NOTE — TELEPHONE ENCOUNTER
"Patient is not feeling right, very weak, jittery, worried, bad headaches, nervous. Pt is thinking her thyroid med is still causing her high bp. She states after she takes her thyroid med she feels like she is in a \"dream state\"     She said the top number has been high as 160 and the bottom number as low as 112     Pt was very upset/worried on the phone. Please advise     [phone]       "

## 2023-06-05 ENCOUNTER — TELEPHONE (OUTPATIENT)
Dept: FAMILY MEDICINE CLINIC | Facility: CLINIC | Age: 53
End: 2023-06-05
Payer: COMMERCIAL

## 2023-06-05 NOTE — TELEPHONE ENCOUNTER
Patient is calling to check in after a RX was called in for her last week.  She wants to get in to see Dr TIAN. She has other symptoms, weakness, anxiety and nervous. She has no appetite, still has headaches. Not sure if the readings she is getting at home are correct.   Asking for an appt. She is very upset and crying and scared to go back to work. Work is stressful. Feels she has lost weight.     Please call pt [phone]

## 2023-06-06 ENCOUNTER — OFFICE VISIT (OUTPATIENT)
Dept: FAMILY MEDICINE CLINIC | Facility: CLINIC | Age: 53
End: 2023-06-06
Payer: COMMERCIAL

## 2023-06-06 VITALS
BODY MASS INDEX: 36.5 KG/M2 | SYSTOLIC BLOOD PRESSURE: 130 MMHG | HEART RATE: 100 BPM | DIASTOLIC BLOOD PRESSURE: 90 MMHG | HEIGHT: 63 IN | OXYGEN SATURATION: 98 % | WEIGHT: 206 LBS

## 2023-06-06 DIAGNOSIS — F41.1 GAD (GENERALIZED ANXIETY DISORDER): ICD-10-CM

## 2023-06-06 DIAGNOSIS — E89.0 POSTABLATIVE HYPOTHYROIDISM: Primary | ICD-10-CM

## 2023-06-06 DIAGNOSIS — R03.0 PREHYPERTENSION: ICD-10-CM

## 2023-06-06 PROCEDURE — 99214 OFFICE O/P EST MOD 30 MIN: CPT | Performed by: FAMILY MEDICINE

## 2023-06-06 RX ORDER — PROPRANOLOL HCL 60 MG
60 CAPSULE, EXTENDED RELEASE 24HR ORAL NIGHTLY
Qty: 30 CAPSULE | Refills: 5 | Status: SHIPPED | OUTPATIENT
Start: 2023-06-06

## 2023-06-06 NOTE — PROGRESS NOTES
Subjective   Liz Salvador is a 53 y.o. female. Presents today for   Chief Complaint   Patient presents with    Hypertension    Anxiety    Depression       Hypertension  Associated symptoms include anxiety and palpitations. Pertinent negatives include no chest pain or shortness of breath.   Anxiety  Symptoms include nervous/anxious behavior and palpitations. Patient reports no chest pain or shortness of breath.     Her past medical history is significant for depression.   DepressionPatient presents with the following symptoms: nervousness/anxiety and palpitations.  Patient is not experiencing: shortness of breath.      Patient 54 y/o female with fear, anxiety;  though may be thyroid and was over replaced slightly;  adjusted dose, felt better but back feeling jittery, shaky and nervous;  also bp is better after changing batteries, but still off;   Crying a lot, not certain why;  Had episode of vertigo few seconds other day has had in past.    Has started walking.    Review of Systems   Constitutional:  Positive for fatigue.   Respiratory:  Negative for shortness of breath.    Cardiovascular:  Positive for palpitations. Negative for chest pain.   Gastrointestinal:  Negative for abdominal pain.   Psychiatric/Behavioral:  Positive for sleep disturbance. The patient is nervous/anxious.      Patient Active Problem List   Diagnosis    Adult hypothyroidism    Vitamin D deficiency    Mixed hyperlipidemia    Prehypertension    Clinical diagnosis of COVID-19       Social History     Socioeconomic History    Marital status:    Tobacco Use    Smoking status: Never    Smokeless tobacco: Never   Vaping Use    Vaping Use: Never used   Substance and Sexual Activity    Alcohol use: No    Drug use: No    Sexual activity: Defer       Allergies   Allergen Reactions    No Known Drug Allergy        Current Outpatient Medications on File Prior to Visit   Medication Sig Dispense Refill    APPLE CIDER VINEGAR PO Take  by mouth.   "    fexofenadine (ALLEGRA) 180 MG tablet Take 1 tablet by mouth Daily. 30 tablet 5    fluticasone (FLONASE) 50 MCG/ACT nasal spray 2 sprays into the nostril(s) as directed by provider Daily.      ibuprofen (ADVIL,MOTRIN) 600 MG tablet Take 1 tablet by mouth Every 8 (Eight) Hours As Needed for Mild Pain. 30 tablet 0    levothyroxine (Synthroid) 112 MCG tablet Take 1 tablet by mouth Daily. 30 tablet 2    vitamin D3 125 MCG (5000 UT) capsule capsule Take 1 capsule by mouth Daily. 90 capsule 3     No current facility-administered medications on file prior to visit.       Objective   Vitals:    06/06/23 0751 06/06/23 0809 06/06/23 0810   BP: 144/86 130/90 130/90   Pulse: 100     SpO2: 98%     Weight: 93.4 kg (206 lb)     Height: 160 cm (63\")       Body mass index is 36.49 kg/m².    Physical Exam  Vitals and nursing note reviewed.   Constitutional:       Appearance: She is well-developed.   Skin:     General: Skin is warm and dry.   Neurological:      Mental Status: She is alert and oriented to person, place, and time.   Psychiatric:         Behavior: Behavior normal.     Component      Latest Ref Rng 5/16/2023   WBC      3.4 - 10.8 x10E3/uL 6.2    RBC      3.77 - 5.28 x10E6/uL 4.67    Hemoglobin      11.1 - 15.9 g/dL 14.6    Hematocrit      34.0 - 46.6 % 42.7    MCV      79 - 97 fL 91    MCH      26.6 - 33.0 pg 31.3    MCHC      31.5 - 35.7 g/dL 34.2    RDW      11.7 - 15.4 % 13.3    Platelets      150 - 450 x10E3/uL 275    Neutrophil Rel %      Not Estab. % 64    Lymphocyte Rel %      Not Estab. % 28    Monocyte Rel %      Not Estab. % 6    Eosinophil Rel %      Not Estab. % 1    Basophil Rel %      Not Estab. % 1    Neutrophils Absolute      1.4 - 7.0 x10E3/uL 4.0    Lymphocytes Absolute      0.7 - 3.1 x10E3/uL 1.8    Monocytes Absolute      0.1 - 0.9 x10E3/uL 0.4    Eosinophils Absolute      0.0 - 0.4 x10E3/uL 0.1    Basophils Absolute      0.0 - 0.2 x10E3/uL 0.0    Immature Granulocyte Rel %      Not Estab. % 0  "   Immature Grans, Absolute      0.0 - 0.1 x10E3/uL 0.0    Glucose      70 - 99 mg/dL 85    BUN      6 - 24 mg/dL 8    Creatinine      0.57 - 1.00 mg/dL 0.94    EGFR Result      >59 mL/min/1.73 72    BUN/Creatinine Ratio      9 - 23  9    Sodium      134 - 144 mmol/L 144    Potassium      3.5 - 5.2 mmol/L 4.5    Chloride      96 - 106 mmol/L 101    CO2      20 - 29 mmol/L 24    Calcium      8.7 - 10.2 mg/dL 10.0    Total Protein      6.0 - 8.5 g/dL 7.7    Albumin      3.8 - 4.9 g/dL 4.9    Globulin      1.5 - 4.5 g/dL 2.8    A/G Ratio      1.2 - 2.2  1.8    Total Bilirubin      0.0 - 1.2 mg/dL 1.1    Alkaline Phosphatase      44 - 121 IU/L 111    AST (SGOT)      0 - 40 IU/L 16    ALT (SGPT)      0 - 32 IU/L 14    Total Cholesterol      100 - 199 mg/dL 247 (H)    Triglycerides      0 - 149 mg/dL 161 (H)    HDL Cholesterol      >39 mg/dL 52    VLDL Cholesterol Lucien      5 - 40 mg/dL 29    LDL Cholesterol       0 - 99 mg/dL 166 (H)    TSH Baseline      0.450 - 4.500 uIU/mL 0.764    Free T4      0.82 - 1.77 ng/dL 1.87 (H)    T3, Free      2.0 - 4.4 pg/mL 2.6    Vitamin B-12      232 - 1,245 pg/mL 492       (H) High    Assessment & Plan   Diagnoses and all orders for this visit:    1. Postablative hypothyroidism (Primary)  -     propranolol LA (INDERAL LA) 60 MG 24 hr capsule; Take 1 capsule by mouth Every Night.  Dispense: 30 capsule; Refill: 5    2. SANG (generalized anxiety disorder)  -     propranolol LA (INDERAL LA) 60 MG 24 hr capsule; Take 1 capsule by mouth Every Night.  Dispense: 30 capsule; Refill: 5    3. Prehypertension  -     propranolol LA (INDERAL LA) 60 MG 24 hr capsule; Take 1 capsule by mouth Every Night.  Dispense: 30 capsule; Refill: 5    Will try propranolol for bp and settle feelign while adjusting thyriod dose                 -Follow up: 7/26/23

## 2023-07-24 ENCOUNTER — OFFICE VISIT (OUTPATIENT)
Dept: FAMILY MEDICINE CLINIC | Facility: CLINIC | Age: 53
End: 2023-07-24
Payer: COMMERCIAL

## 2023-07-24 VITALS
HEIGHT: 63 IN | WEIGHT: 199.4 LBS | SYSTOLIC BLOOD PRESSURE: 158 MMHG | DIASTOLIC BLOOD PRESSURE: 90 MMHG | BODY MASS INDEX: 35.33 KG/M2 | HEART RATE: 86 BPM | OXYGEN SATURATION: 97 %

## 2023-07-24 DIAGNOSIS — F41.1 GAD (GENERALIZED ANXIETY DISORDER): ICD-10-CM

## 2023-07-24 DIAGNOSIS — I10 PRIMARY HYPERTENSION: Primary | ICD-10-CM

## 2023-07-24 DIAGNOSIS — E03.9 ADULT HYPOTHYROIDISM: ICD-10-CM

## 2023-07-24 DIAGNOSIS — E89.0 POSTABLATIVE HYPOTHYROIDISM: ICD-10-CM

## 2023-07-24 PROCEDURE — 99214 OFFICE O/P EST MOD 30 MIN: CPT | Performed by: FAMILY MEDICINE

## 2023-07-24 RX ORDER — LEVOTHYROXINE SODIUM 112 MCG
112 TABLET ORAL DAILY
Qty: 90 TABLET | Refills: 3 | Status: SHIPPED | OUTPATIENT
Start: 2023-07-24

## 2023-07-24 RX ORDER — PROPRANOLOL HCL 60 MG
60 CAPSULE, EXTENDED RELEASE 24HR ORAL NIGHTLY
Qty: 90 CAPSULE | Refills: 1 | Status: SHIPPED | OUTPATIENT
Start: 2023-07-24

## 2023-07-24 NOTE — PATIENT INSTRUCTIONS
Calorie Counting for Weight Loss  Calories are units of energy. Your body needs a certain number of calories from food to keep going throughout the day. When you eat or drink more calories than your body needs, your body stores the extra calories mostly as fat. When you eat or drink fewer calories than your body needs, your body burns fat to get the energy it needs.  Calorie counting means keeping track of how many calories you eat and drink each day. Calorie counting can be helpful if you need to lose weight. If you eat fewer calories than your body needs, you should lose weight. Ask your health care provider what a healthy weight is for you.  For calorie counting to work, you will need to eat the right number of calories each day to lose a healthy amount of weight per week. A dietitian can help you figure out how many calories you need in a day and will suggest ways to reach your calorie goal.  A healthy amount of weight to lose each week is usually 1-2 lb (0.5-0.9 kg). This usually means that your daily calorie intake should be reduced by 500-750 calories.  Eating 1,200-1,500 calories a day can help most women lose weight.  Eating 1,500-1,800 calories a day can help most men lose weight.  What do I need to know about calorie counting?  Work with your health care provider or dietitian to determine how many calories you should get each day. To meet your daily calorie goal, you will need to:  Find out how many calories are in each food that you would like to eat. Try to do this before you eat.  Decide how much of the food you plan to eat.  Keep a food log. Do this by writing down what you ate and how many calories it had.  To successfully lose weight, it is important to balance calorie counting with a healthy lifestyle that includes regular activity.  Where do I find calorie information?  The number of calories in a food can be found on a Nutrition Facts label. If a food does not have a Nutrition Facts label, try to  look up the calories online or ask your dietitian for help.  Remember that calories are listed per serving. If you choose to have more than one serving of a food, you will have to multiply the calories per serving by the number of servings you plan to eat. For example, the label on a package of bread might say that a serving size is 1 slice and that there are 90 calories in a serving. If you eat 1 slice, you will have eaten 90 calories. If you eat 2 slices, you will have eaten 180 calories.  How do I keep a food log?  After each time that you eat, record the following in your food log as soon as possible:  What you ate. Be sure to include toppings, sauces, and other extras on the food.  How much you ate. This can be measured in cups, ounces, or number of items.  How many calories were in each food and drink.  The total number of calories in the food you ate.  Keep your food log near you, such as in a pocket-sized notebook or on an aureliano or website on your mobile phone. Some programs will calculate calories for you and show you how many calories you have left to meet your daily goal.  What are some portion-control tips?  Know how many calories are in a serving. This will help you know how many servings you can have of a certain food.  Use a measuring cup to measure serving sizes. You could also try weighing out portions on a kitchen scale. With time, you will be able to estimate serving sizes for some foods.  Take time to put servings of different foods on your favorite plates or in your favorite bowls and cups so you know what a serving looks like.  Try not to eat straight from a food's packaging, such as from a bag or box. Eating straight from the package makes it hard to see how much you are eating and can lead to overeating. Put the amount you would like to eat in a cup or on a plate to make sure you are eating the right portion.  Use smaller plates, glasses, and bowls for smaller portions and to prevent  overeating.  Try not to multitask. For example, avoid watching TV or using your computer while eating. If it is time to eat, sit down at a table and enjoy your food. This will help you recognize when you are full. It will also help you be more mindful of what and how much you are eating.  What are tips for following this plan?  Reading food labels  Check the calorie count compared with the serving size. The serving size may be smaller than what you are used to eating.  Check the source of the calories. Try to choose foods that are high in protein, fiber, and vitamins, and low in saturated fat, trans fat, and sodium.  Shopping  Read nutrition labels while you shop. This will help you make healthy decisions about which foods to buy.  Pay attention to nutrition labels for low-fat or fat-free foods. These foods sometimes have the same number of calories or more calories than the full-fat versions. They also often have added sugar, starch, or salt to make up for flavor that was removed with the fat.  Make a grocery list of lower-calorie foods and stick to it.  Cooking  Try to cook your favorite foods in a healthier way. For example, try baking instead of frying.  Use low-fat dairy products.  Meal planning  Use more fruits and vegetables. One-half of your plate should be fruits and vegetables.  Include lean proteins, such as chicken, turkey, and fish.  Lifestyle  Each week, aim to do one of the followin minutes of moderate exercise, such as walking.  75 minutes of vigorous exercise, such as running.  General information  Know how many calories are in the foods you eat most often. This will help you calculate calorie counts faster.  Find a way of tracking calories that works for you. Get creative. Try different apps or programs if writing down calories does not work for you.  What foods should I eat?    Eat nutritious foods. It is better to have a nutritious, high-calorie food, such as an avocado, than a food with  few nutrients, such as a bag of potato chips.  Use your calories on foods and drinks that will fill you up and will not leave you hungry soon after eating.  Examples of foods that fill you up are nuts and nut butters, vegetables, lean proteins, and high-fiber foods such as whole grains. High-fiber foods are foods with more than 5 g of fiber per serving.  Pay attention to calories in drinks. Low-calorie drinks include water and unsweetened drinks.  The items listed above may not be a complete list of foods and beverages you can eat. Contact a dietitian for more information.  What foods should I limit?  Limit foods or drinks that are not good sources of vitamins, minerals, or protein or that are high in unhealthy fats. These include:  Candy.  Other sweets.  Sodas, specialty coffee drinks, alcohol, and juice.  The items listed above may not be a complete list of foods and beverages you should avoid. Contact a dietitian for more information.  How do I count calories when eating out?  Pay attention to portions. Often, portions are much larger when eating out. Try these tips to keep portions smaller:  Consider sharing a meal instead of getting your own.  If you get your own meal, eat only half of it. Before you start eating, ask for a container and put half of your meal into it.  When available, consider ordering smaller portions from the menu instead of full portions.  Pay attention to your food and drink choices. Knowing the way food is cooked and what is included with the meal can help you eat fewer calories.  If calories are listed on the menu, choose the lower-calorie options.  Choose dishes that include vegetables, fruits, whole grains, low-fat dairy products, and lean proteins.  Choose items that are boiled, broiled, grilled, or steamed. Avoid items that are buttered, battered, fried, or served with cream sauce. Items labeled as crispy are usually fried, unless stated otherwise.  Choose water, low-fat milk,  unsweetened iced tea, or other drinks without added sugar. If you want an alcoholic beverage, choose a lower-calorie option, such as a glass of wine or light beer.  Ask for dressings, sauces, and syrups on the side. These are usually high in calories, so you should limit the amount you eat.  If you want a salad, choose a garden salad and ask for grilled meats. Avoid extra toppings such as diego, cheese, or fried items. Ask for the dressing on the side, or ask for olive oil and vinegar or lemon to use as dressing.  Estimate how many servings of a food you are given. Knowing serving sizes will help you be aware of how much food you are eating at restaurants.  Where to find more information  Centers for Disease Control and Prevention: www.cdc.gov  [place] Department of Agriculture: myplate.gov  Summary  Calorie counting means keeping track of how many calories you eat and drink each day. If you eat fewer calories than your body needs, you should lose weight.  A healthy amount of weight to lose per week is usually 1-2 lb (0.5-0.9 kg). This usually means reducing your daily calorie intake by 500-750 calories.  The number of calories in a food can be found on a Nutrition Facts label. If a food does not have a Nutrition Facts label, try to look up the calories online or ask your dietitian for help.  Use smaller plates, glasses, and bowls for smaller portions and to prevent overeating.  Use your calories on foods and drinks that will fill you up and not leave you hungry shortly after a meal.  This information is not intended to replace advice given to you by your health care provider. Make sure you discuss any questions you have with your health care provider.  Document Revised: 01/28/2021 Document Reviewed: 01/28/2021  Elsevier Patient Education © 2022 [employer]    Exercising to Lose Weight  Getting regular exercise is important for everyone. It is especially important if you are overweight. Being overweight increases  your risk of heart disease, stroke, diabetes, high blood pressure, and several types of cancer. Exercising, and reducing the calories you consume, can help you lose weight and improve fitness and health.  Exercise can be moderate or vigorous intensity. To lose weight, most people need to do a certain amount of moderate or vigorous-intensity exercise each week.  How can exercise affect me?  You lose weight when you exercise enough to burn more calories than you eat. Exercise also reduces body fat and builds muscle. The more muscle you have, the more calories you burn. Exercise also:  Improves mood.  Reduces stress and tension.  Improves your overall fitness, flexibility, and endurance.  Increases bone strength.  Moderate-intensity exercise  Moderate-intensity exercise is any activity that gets you moving enough to burn at least three times more energy (calories) than if you were sitting.  Examples of moderate exercise include:  Walking a mile in 15 minutes.  Doing light yard work.  Biking at an easy pace.  Most people should get at least 150 minutes of moderate-intensity exercise a week to maintain their body weight.  Vigorous-intensity exercise  Vigorous-intensity exercise is any activity that gets you moving enough to burn at least six times more calories than if you were sitting. When you exercise at this intensity, you should be working hard enough that you are not able to carry on a conversation.  Examples of vigorous exercise include:  Running.  Playing a team sport, such as football, basketball, and soccer.  Jumping rope.  Most people should get at least 75 minutes a week of vigorous exercise to maintain their body weight.  What actions can I take to lose weight?  The amount of exercise you need to lose weight depends on:  Your age.  The type of exercise.  Any health conditions you have.  Your overall physical ability.  Talk to your health care provider about how much exercise you need and what types of  activities are safe for you.  Nutrition    Make changes to your diet as told by your health care provider or diet and nutrition specialist (dietitian). This may include:  Eating fewer calories.  Eating more protein.  Eating less unhealthy fats.  Eating a diet that includes fresh fruits and vegetables, whole grains, low-fat dairy products, and lean protein.  Avoiding foods with added fat, salt, and sugar.  Drink plenty of water while you exercise to prevent dehydration or heat stroke.  Activity  Choose an activity that you enjoy and set realistic goals. Your health care provider can help you make an exercise plan that works for you.  Exercise at a moderate or vigorous intensity most days of the week.  The intensity of exercise may vary from person to person. You can tell how intense a workout is for you by paying attention to your breathing and heartbeat. Most people will notice their breathing and heartbeat get faster with more intense exercise.  Do resistance training twice each week, such as:  Push-ups.  Sit-ups.  Lifting weights.  Using resistance bands.  Getting short amounts of exercise can be just as helpful as long, structured periods of exercise. If you have trouble finding time to exercise, try doing these things as part of your daily routine:  Get up, stretch, and walk around every 30 minutes throughout the day.  Go for a walk during your lunch break.  Park your car farther away from your destination.  If you take public transportation, get off one stop early and walk the rest of the way.  Make phone calls while standing up and walking around.  Take the stairs instead of elevators or escalators.  Wear comfortable clothes and shoes with good support.  Do not exercise so much that you hurt yourself, feel dizzy, or get very short of breath.  Where to find more information  [place] Department of Health and Human Services: www.hhs.gov  Centers for Disease Control and Prevention: www.cdc.gov  Contact a health care  provider:  Before starting a new exercise program.  If you have questions or concerns about your weight.  If you have a medical problem that keeps you from exercising.  Get help right away if:  You have any of the following while exercising:  Injury.  Dizziness.  Difficulty breathing or shortness of breath that does not go away when you stop exercising.  Chest pain.  Rapid heartbeat.  These symptoms may represent a serious problem that is an emergency. Do not wait to see if the symptoms will go away. Get medical help right away. Call your local emergency services (911 in the [place] Do not drive yourself to the hospital.  Summary  Getting regular exercise is especially important if you are overweight.  Being overweight increases your risk of heart disease, stroke, diabetes, high blood pressure, and several types of cancer.  Losing weight happens when you burn more calories than you eat.  Reducing the amount of calories you eat, and getting regular moderate or vigorous exercise each week, helps you lose weight.  This information is not intended to replace advice given to you by your health care provider. Make sure you discuss any questions you have with your health care provider.  Document Revised: 02/13/2022 Document Reviewed: 02/13/2022  Elsevier Patient Education © 2022 [employer]

## 2023-07-24 NOTE — PROGRESS NOTES
Subjective   Liz Salvador is a 53 y.o. female. Presents today for   Chief Complaint   Patient presents with    Hypothyroidism     Lab work fu       History of Present Illness  Patient 52 y/o female with hypothryodism, decrased dose and feeling better, less anxieyt;  had 1 panic attack;   Was exercising regularly helping bp but had bronchitis and stopped;  When ever gets sick develops vertigo;  BP up and down, doing better;  Did not start bp med;    has strong fam hx of htn;  is losing weight intentionally;       Review of Systems   Respiratory:  Negative for shortness of breath.    Cardiovascular:  Negative for chest pain.   Neurological:  Positive for dizziness.     Patient Active Problem List   Diagnosis    Adult hypothyroidism    Vitamin D deficiency    Mixed hyperlipidemia    Prehypertension    Clinical diagnosis of COVID-19       Social History     Socioeconomic History    Marital status:    Tobacco Use    Smoking status: Never    Smokeless tobacco: Never   Vaping Use    Vaping Use: Never used   Substance and Sexual Activity    Alcohol use: No    Drug use: No    Sexual activity: Defer       Allergies   Allergen Reactions    No Known Drug Allergy        Current Outpatient Medications on File Prior to Visit   Medication Sig Dispense Refill    amoxicillin (AMOXIL) 875 MG tablet Take 1 tablet by mouth 2 (Two) Times a Day. 20 tablet 0    fluticasone (FLONASE) 50 MCG/ACT nasal spray 2 sprays into the nostril(s) as directed by provider Daily.      vitamin D3 125 MCG (5000 UT) capsule capsule Take 1 capsule by mouth Daily. 90 capsule 3    [DISCONTINUED] levothyroxine (Synthroid) 112 MCG tablet Take 1 tablet by mouth Daily. 30 tablet 2    [DISCONTINUED] propranolol LA (INDERAL LA) 60 MG 24 hr capsule Take 1 capsule by mouth Every Night. 30 capsule 5    [DISCONTINUED] APPLE CIDER VINEGAR PO Take  by mouth. (Patient not taking: Reported on 7/24/2023)      [DISCONTINUED] fexofenadine (ALLEGRA) 180 MG tablet  "Take 1 tablet by mouth Daily. (Patient not taking: Reported on 7/24/2023) 30 tablet 5    [DISCONTINUED] ibuprofen (ADVIL,MOTRIN) 600 MG tablet Take 1 tablet by mouth Every 8 (Eight) Hours As Needed for Mild Pain. (Patient not taking: Reported on 7/24/2023) 30 tablet 0     No current facility-administered medications on file prior to visit.       Objective   Vitals:    07/24/23 1549   BP: 158/90   Pulse: 86   SpO2: 97%   Weight: 90.4 kg (199 lb 6.4 oz)   Height: 160 cm (63\")     Body mass index is 35.32 kg/m².    Physical Exam  Vitals and nursing note reviewed.   Constitutional:       Appearance: Normal appearance. She is not toxic-appearing or diaphoretic.   HENT:      Head: Normocephalic and atraumatic.   Musculoskeletal:      Cervical back: Neck supple.   Skin:     General: Skin is warm and dry.      Capillary Refill: Capillary refill takes less than 2 seconds.   Neurological:      Mental Status: She is alert.   Psychiatric:         Mood and Affect: Mood normal.         Behavior: Behavior normal.     Component      Latest Ref Rng 7/17/2023   TSH Baseline      0.270 - 4.200 uIU/mL 1.650          Assessment & Plan   Diagnoses and all orders for this visit:    1. Primary hypertension (Primary)    2. Adult hypothyroidism  -     Synthroid 112 MCG tablet; Take 1 tablet by mouth Daily.  Dispense: 90 tablet; Refill: 3    3. Postablative hypothyroidism  -     propranolol LA (INDERAL LA) 60 MG 24 hr capsule; Take 1 capsule by mouth Every Night.  Dispense: 90 capsule; Refill: 1    4. SANG (generalized anxiety disorder)  -     propranolol LA (INDERAL LA) 60 MG 24 hr capsule; Take 1 capsule by mouth Every Night.  Dispense: 90 capsule; Refill: 1          Is on generic thyroid, I would go back to synthroid brand.   BP too high, start inderal LA  Dropped more weight, doing well 36 lbs since 2016.               -Follow up: 2 to 3 months and prn   "

## 2023-10-05 ENCOUNTER — NURSE TRIAGE (OUTPATIENT)
Dept: CALL CENTER | Facility: HOSPITAL | Age: 53
End: 2023-10-05
Payer: COMMERCIAL

## 2023-10-05 ENCOUNTER — TELEPHONE (OUTPATIENT)
Dept: FAMILY MEDICINE CLINIC | Facility: CLINIC | Age: 53
End: 2023-10-05

## 2023-10-05 DIAGNOSIS — R00.2 PALPITATIONS: Primary | ICD-10-CM

## 2023-10-05 RX ORDER — AMLODIPINE BESYLATE 5 MG/1
5 TABLET ORAL
Qty: 30 TABLET | Refills: 1 | Status: SHIPPED | OUTPATIENT
Start: 2023-10-05 | End: 2023-11-13

## 2023-10-05 NOTE — TELEPHONE ENCOUNTER
"Patient started new prescription for propranolol yesterday. C/o slow heart rate, sluggishness, fatigue, and dizziness x 2 days. Reviewed protocol with patient. Advised patient that she should go to the ER per protocol. Patient wishes to speak with PCP who prescribed the medication. Connected patient with Ivy at Dr. Ferrer's office.    Reason for Disposition   Dizziness, lightheadedness, or weakness    Additional Information   Negative: Passed out (i.e., lost consciousness, collapsed and was not responding)   Negative: Shock suspected (e.g., cold/pale/clammy skin, too weak to stand, low BP, rapid pulse)   Negative: Difficult to awaken or acting confused (e.g., disoriented, slurred speech)   Negative: Visible sweat on face or sweat dripping down face   Negative: Unable to walk, or can only walk with assistance (e.g., requires support)   Negative: [1] Received SHOCK from implantable cardiac defibrillator AND [2] persisting symptoms (i.e., palpitations, lightheadedness)   Negative: [1] Dizziness, lightheadedness, or weakness AND [2] heart beating very rapidly (e.g., > 140 / minute)   Negative: [1] Dizziness, lightheadedness, or weakness AND [2] heart beating very slowly (e.g., < 50 / minute)   Negative: Sounds like a life-threatening emergency to the triager   Negative: Chest pain   Negative: Implantable Cardiac Defibrillator (ICD) or a pacemaker symptoms or questions   Negative: Difficulty breathing    Answer Assessment - Initial Assessment Questions  1. DESCRIPTION: \"Please describe your heart rate or heartbeat that you are having\" (e.g., fast/slow, regular/irregular, skipped or extra beats, \"palpitations\")      Slow heart rate  2. ONSET: \"When did it start?\" (Minutes, hours or days)       Yesterday   3. DURATION: \"How long does it last\" (e.g., seconds, minutes, hours)      Since starting propranolol  4. PATTERN \"Does it come and go, or has it been constant since it started?\"  \"Does it get worse with exertion?\"   " "\"Are you feeling it now?\"      Constant   5. TAP: \"Using your hand, can you tap out what you are feeling on a chair or table in front of you, so that I can hear?\" (Note: not all patients can do this)        NA  6. HEART RATE: \"Can you tell me your heart rate?\" \"How many beats in 15 seconds?\"  (Note: not all patients can do this)        50  7. RECURRENT SYMPTOM: \"Have you ever had this before?\" If Yes, ask: \"When was the last time?\" and \"What happened that time?\"       No   8. CAUSE: \"What do you think is causing the palpitations?\"      Propranolol   9. CARDIAC HISTORY: \"Do you have any history of heart disease?\" (e.g., heart attack, angina, bypass surgery, angioplasty, arrhythmia)       Yes, see chart  10. OTHER SYMPTOMS: \"Do you have any other symptoms?\" (e.g., dizziness, chest pain, sweating, difficulty breathing)        Sluggish, tired  11. PREGNANCY: \"Is there any chance you are pregnant?\" \"When was your last menstrual period?\"        No    Protocols used: Heart Rate and Heartbeat Questions-ADULT-AH    "

## 2023-10-05 NOTE — TELEPHONE ENCOUNTER
Caller: Liz Salvador    Relationship: Self    Best call back number: [phone]    What is the medical concern/diagnosis: HEART PALPITATIONS    What specialty or service is being requested: HEART SPECIALISTS/CARDIOLOGISTS    What is the provider, practice or medical service name: RIKKI AVILA MD    What is the office location: Address: [facility] [address], [place] [address]    What is the office phone number: Phone: [phone]    Any additional details: PATIENT STATED THAT SHE PRESENTED TO THE ER A FEW DAYS AGO FOR HEART PALPITATION AND THEY RAN AN EKG,LABS,AND A X-RAY WHICH CAME BACK NORMAL. PATIENT STATED THAT THEY WANTED HER TO SEE A HEART SPECIALIST AND SHE WAS WANTING TO SEE DR AVILA BUT THEY WOULD NOT BE ABLE TO GET HER IN FOR OVER A MONTH SO PATIENT IS WANTING TO KNOW IF DR ELKINS WOULD BE ABLE TO GET HER IN SOONER.PATIENT IS REQUESTING A CALLBACK WITH UPDATES.

## 2023-10-06 ENCOUNTER — TELEPHONE (OUTPATIENT)
Dept: FAMILY MEDICINE CLINIC | Facility: CLINIC | Age: 53
End: 2023-10-06
Payer: COMMERCIAL

## 2023-10-06 DIAGNOSIS — F41.9 ANXIETY: Primary | ICD-10-CM

## 2023-10-06 RX ORDER — BUSPIRONE HYDROCHLORIDE 5 MG/1
5 TABLET ORAL 2 TIMES DAILY
Qty: 60 TABLET | Refills: 5 | Status: SHIPPED | OUTPATIENT
Start: 2023-10-06

## 2023-10-06 RX ORDER — BUSPIRONE HYDROCHLORIDE 5 MG/1
5 TABLET ORAL 2 TIMES DAILY
COMMUNITY
End: 2023-10-06 | Stop reason: SDUPTHER

## 2023-10-06 NOTE — TELEPHONE ENCOUNTER
Yes, I did let her know that when I spoke with her earlier. She is more concerned with the anxiety issue so is wanting something to help with that while she is waiting on her cardiology appt.

## 2023-10-06 NOTE — TELEPHONE ENCOUNTER
Spoke with pt and had her D/C the propranolol. Jo sent her in some Amlodipine yesterday which she was hesitant about taking but after we discussed it in further detail, she now feels better about it and will go ahead and get started on it.     Dr. Ferrer,    Pt is having severe anxiety with palpitations. She had this checked out and EKG was normal which did make pt feel better but she is still scared something is going to happen while waiting to get in with Dr. Newton in 1 month. She doesn't even feel safe going to work cause she is scared something is going to happen to her and she doesn't feel safe. She said she has had a panic attack as well and when she gets these, she feels completely drained afterwards. She was very tearful with me on the phone and is requesting something to help with her anxiety. The ER told her that the palpitations are probably coming from anxiety. Please advise. Thanks.

## 2023-10-06 NOTE — TELEPHONE ENCOUNTER
Pt informed the meds were sent in. She is going to start the amlodipine first to make sure all is good there and then if no bad side effects in a couple of days, she will start the buspar.

## 2023-10-10 ENCOUNTER — TELEPHONE (OUTPATIENT)
Dept: FAMILY MEDICINE CLINIC | Facility: CLINIC | Age: 53
End: 2023-10-10
Payer: COMMERCIAL

## 2023-10-10 NOTE — TELEPHONE ENCOUNTER
Pt states that her boss at [employer] has advised her to apply for a leave of absence/short term disability. Her anxiety has been getting worse. She had to miss work on 10/06 and missed work today 10/10 due to her Anxiety. She is experiencing bad attacks with low heart rate and palpitations. Yesterday she was off work and states her anxiety was so bad she could not function or go into the store to  her anxiety medication.     Pt states she spoke with Jo Farris on 10/06 and was advised to start Amlodipine and a few days later start the Buspar that Dr. Ferrer had sent in. Tomorrow will be her first day starting the Buspar.  Pt states she has not noticed a difference since she started taking the Amlodipine. Advised she could come in to see Jo Farris if she feels like she needs to be seen but she just wanted us to be aware of what is going on.     She has started a claim with [employer] and they are getting the form ready and they will be faxed to us soon for [facility] to fill out.    [phone]

## 2023-10-12 NOTE — TELEPHONE ENCOUNTER
Pt saw cardiology Dr Newton today and her EKG was not normal and she is currently taking Buspar for anxiety and amlodipine she is currently going to have scheduled holter and echo per cardiology she is very confused on meds. She has some STD forms coming over to be filled out

## 2023-10-13 ENCOUNTER — OFFICE VISIT (OUTPATIENT)
Dept: FAMILY MEDICINE CLINIC | Facility: CLINIC | Age: 53
End: 2023-10-13
Payer: COMMERCIAL

## 2023-10-13 VITALS
SYSTOLIC BLOOD PRESSURE: 140 MMHG | WEIGHT: 191 LBS | HEIGHT: 63 IN | OXYGEN SATURATION: 98 % | DIASTOLIC BLOOD PRESSURE: 90 MMHG | RESPIRATION RATE: 16 BRPM | HEART RATE: 87 BPM | BODY MASS INDEX: 33.84 KG/M2

## 2023-10-13 DIAGNOSIS — F41.0 PANIC ATTACK: ICD-10-CM

## 2023-10-13 DIAGNOSIS — R00.2 PALPITATIONS: Primary | ICD-10-CM

## 2023-10-13 DIAGNOSIS — F41.1 GAD (GENERALIZED ANXIETY DISORDER): ICD-10-CM

## 2023-10-13 DIAGNOSIS — E03.9 ADULT HYPOTHYROIDISM: ICD-10-CM

## 2023-10-13 DIAGNOSIS — I10 PRIMARY HYPERTENSION: ICD-10-CM

## 2023-10-13 PROCEDURE — 99214 OFFICE O/P EST MOD 30 MIN: CPT | Performed by: FAMILY MEDICINE

## 2023-10-13 RX ORDER — LEVOTHYROXINE SODIUM 112 UG/1
112 TABLET ORAL DAILY
Start: 2023-10-13

## 2023-10-13 RX ORDER — ESCITALOPRAM OXALATE 10 MG/1
TABLET ORAL
Qty: 30 TABLET | Refills: 5 | Status: SHIPPED | OUTPATIENT
Start: 2023-10-13 | End: 2023-10-30

## 2023-10-13 RX ORDER — DIPHENOXYLATE HYDROCHLORIDE AND ATROPINE SULFATE 2.5; .025 MG/1; MG/1
TABLET ORAL DAILY
COMMUNITY

## 2023-10-13 NOTE — PROGRESS NOTES
Subjective   Liz Salvador is a 53 y.o. female. Presents today for   Chief Complaint   Patient presents with    Hypothyroidism     FUP FORMS STD     Anxiety    Annual Exam     Discuss forms fmla    Medication Problem     Discuss buspar        History of Present Illness  Patient 54 y/o here for f/u of palpitaitons;  Has had cp, severe anxiety and panic attacks;  has not been able to leave house and needs time off to adjust to meds;   has been losing weight intentionally.   Still having off/on palpitations.     Review of Systems   Respiratory:  Negative for shortness of breath.    Cardiovascular:  Positive for chest pain and palpitations. Negative for leg swelling.   Psychiatric/Behavioral:  The patient is nervous/anxious.        Patient Active Problem List   Diagnosis    Adult hypothyroidism    Vitamin D deficiency    Mixed hyperlipidemia    Prehypertension    Clinical diagnosis of COVID-19       Social History     Socioeconomic History    Marital status:    Tobacco Use    Smoking status: Never    Smokeless tobacco: Never   Vaping Use    Vaping Use: Never used   Substance and Sexual Activity    Alcohol use: No    Drug use: No    Sexual activity: Defer       Allergies   Allergen Reactions    Propranolol Other (See Comments)     Bradycardia       Current Outpatient Medications on File Prior to Visit   Medication Sig Dispense Refill    amLODIPine (NORVASC) 5 MG tablet Take 1 tablet by mouth every night at bedtime. 30 tablet 1    busPIRone (BUSPAR) 5 MG tablet Take 1 tablet by mouth 2 (Two) Times a Day. 60 tablet 5    fluticasone (FLONASE) 50 MCG/ACT nasal spray 2 sprays into the nostril(s) as directed by provider Daily.      multivitamin (MULTI-VITAMIN DAILY PO) Take  by mouth Daily.      Synthroid 112 MCG tablet Take 1 tablet by mouth Daily. 90 tablet 3    vitamin D3 125 MCG (5000 UT) capsule capsule Take 1 capsule by mouth Daily. 90 capsule 3    [DISCONTINUED] amoxicillin (AMOXIL) 875 MG tablet Take 1  "tablet by mouth 2 (Two) Times a Day. (Patient not taking: Reported on 10/13/2023) 20 tablet 0     No current facility-administered medications on file prior to visit.       Objective   Vitals:    10/13/23 1122   BP: 158/92   Pulse: 87   Resp: 16   SpO2: 98%   Weight: 86.6 kg (191 lb)   Height: 160 cm (63\")   PainSc: 0-No pain     Body mass index is 33.83 kg/m².    Physical Exam  Vitals and nursing note reviewed.   Constitutional:       Appearance: She is well-developed.   HENT:      Head: Normocephalic and atraumatic.   Neck:      Thyroid: No thyromegaly.      Vascular: No JVD.   Cardiovascular:      Rate and Rhythm: Normal rate and regular rhythm.      Heart sounds: Normal heart sounds. No murmur heard.     No friction rub. No gallop.   Pulmonary:      Effort: Pulmonary effort is normal. No respiratory distress.      Breath sounds: Normal breath sounds. No wheezing or rales.   Abdominal:      General: Bowel sounds are normal. There is no distension.      Palpations: Abdomen is soft.      Tenderness: There is no abdominal tenderness. There is no guarding or rebound.   Musculoskeletal:      Cervical back: Neck supple.   Skin:     General: Skin is warm and dry.   Neurological:      Mental Status: She is alert.   Psychiatric:         Behavior: Behavior normal.       TSH  Order: [id number]   suggestion  Result Information displayed in this report will not trend and may not trigger automated decision support.     Component  Ref Range & Units 3 wk ago   Thyroid Stimulating Hormone  0.350 - 4.940 m(iU)/L 2.510     Specimen Collected: 10/04/23 00:14    Performed by: [facility]  ([id number]) Last Resulted: 10/04/23 01:16   Received From: [facility]  Result Received: 10/12/23 12:53    View Encounter        Received Information  T4, FREE  Order: [id number]  Collected 10/4/2023 00:14    Specimen Comment: Specimen type and source: Serum, Blood       Component  Ref Range & Units 3 wk ago   Free T4  0.7 - 1.48 ng/dL 1.07    "     View Full Report        Specimen Collected: 10/04/23 00:14 EDT    Performed By: [facility]  ([id number]) Last Resulted: 10/04/23 01:16 EDT   Received From: [facility]  Result Received: 10/12/23 12:53       Received Information                TROPONIN  Order: [id number]  Collected 10/3/2023 21:28    Specimen Information: Fresh Frozen Plasma   Specimen Comment: Specimen type and source: Plasma, Blood       Component  Ref Range & Units 3 wk ago   Troponin I  0.000 - 0.028 ng/mL <0.010   Comment: (note)  Levels >0.028 are greater than the 99th percentile and suggest  possible need for clinical correlation and serial testing.        View Full Report        Specimen Collected: 10/03/23 21:28 EDT    Performed By: [facility]  ([id number]) Last Resulted: 10/03/23 22:16 EDT   Received From: [facility]  Result Received: 10/12/23 12:53       Received Information                TROPONIN  Order: [id number]  Collected 10/3/2023 19:02    Specimen Information: Fresh Frozen Plasma   Specimen Comment: Specimen type and source: Plasma, Blood       Component  Ref Range & Units 3 wk ago   Troponin I  0.000 - 0.028 ng/mL <0.010   Comment: (note)  Levels >0.028 are greater than the 99th percentile and suggest  possible need for clinical correlation and serial testing.        View Full Report        Specimen Collected: 10/03/23 19:02 EDT    Performed By: [facility]  ([id number]) Last Resulted: 10/03/23 19:48 EDT   Received From: [facility]  Result Received: 10/13/23 10:40       Received Information                PROBNP (REFERENCE)  Order: [id number]  Collected 10/3/2023 19:02    Specimen Information: Fresh Frozen Plasma   Specimen Comment: Specimen type and source: Plasma, Blood       Component  Ref Range & Units 3 wk ago   BNP  0 - 119 pg/mL 22.8        View Full Report        Specimen Collected: 10/03/23 19:02 EDT    Performed By: [facility]  ([id number]) Last Resulted: 10/03/23 19:48 EDT   Received From: [facility]  Result Received: 10/12/23 12:53       Received Information                CBC AND DIFFERENTIAL  Order: [id number]  Collected 10/3/2023 19:02    Specimen Comment: Specimen type and source: Whole Blood, Blood       Component  Ref Range & Units 3 wk ago   WBC  4.5 - 11.0 10*3/uL 8.08   RBC  4.0 - 5.2 10*6/uL 4.74   Hemoglobin  12.0 - 16.0 g/dL 14.5   Hematocrit  36.0 - 46.0 % 43.1   MCV  80.0 - 100.0 fL 90.9   MCH  26.0 - 34.0 pg 30.6   MCHC  31.0 - 37.0 g/dL 33.6   RDW  12.0 - 16.8 % 12.8   Platelets  140 - 440 10*3/uL 256   MPV  8.4 - 12.4 fL 9.8   Differential Type  (arb'U) Hospital CBC w/AutoDiff   Neutrophil Rel %  45 - 80 % 64.3   Lymphocyte Rel %  15 - 50 % 28.5   Monocyte Rel %  0 - 15 % 5.0   Eosinophil %  0 - 7 % 1.5   Basophil Rel %  0 - 2 % 0.5   Immature Grans %  0.0 - 1.0 % 0.2   nRBC  0 /100(WBC) 0   Neutrophils Absolute  2.0 - 8.8 10*3/uL 5.20   Lymphocytes Absolute  0.7 - 5.5 10*3/uL 2.30   Monocytes Absolute  0.0 - 1.7 10*3/uL 0.40   Eosinophils Absolute  0.0 - 0.8 10*3/uL 0.12   Basophils Absolute  0.0 - 0.2 10*3/uL 0.04   Immature Grans, Absolute  0.00 - 0.10 10*3/uL 0.02        View Full Report        Specimen Collected: 10/03/23 19:02 EDT    Performed By: [facility]  ([id number]) Last Resulted: 10/03/23 19:36 EDT   Received From: [facility]  Result Received: 10/12/23 12:53       Received Information                MAGNESIUM  Order: [id number]  Collected 10/3/2023 19:02    Specimen Information: Fresh Frozen Plasma   Specimen Comment: Specimen type and source: Plasma, Blood       Component  Ref Range & Units 3 wk ago   Magnesium  1.6 - 2.6 mg/dL 1.9        View Full Report        Specimen Collected: 10/03/23 19:02 EDT    Performed By: [facility]  ([id number]) Last Resulted: 10/03/23 19:48 EDT   Received From: [facility]  Result Received: 10/12/23 12:53       Received Information                 Contains abnormal data COMPREHENSIVE METABOLIC PANEL  Order: [id number]    suggestion  Result Information displayed in this report will not trend and may not trigger automated decision support.     Component  Ref Range & Units 3 wk ago   Sodium  136 - 145 mmol/L 144   Comment: (note)  Excess protein and/or lipids can falsely decrease sodium levels  (pseudo hyponatremia).   Potassium  3.5 - 5.1 mmol/L 3.8   Comment: Falsely elevated potassium can occur in patients with high WBC or platelet counts.   Chloride  98 - 107 mmol/L 107   Comment: (note)  Falsely elevated chloride levels can be seen in patients taking  medications which contain bromide.   Total CO2  22 - 29 mmol/L 23   Anion Gap  5 - 13 (arb'U) 14 High    Comment: (note)  Calculation- Na - (Cl + CO2)   Glucose  71 - 139 mg/dL 96   Comment: (note)  Reference range based on inpatient hypo/hyperglycemic treatment  levels. A random glucose =/>200 is concerning for poor control.   BUN  10 - 20 mg/dL 11   Creatinine  0.55 - 1.02 mg/dL 0.98   BUN/Creatinine Ratio  RATIO 11.2   Estimated GFR (Cr)  >60 /1.73 m2 68   Comment: (note)  eGFR calculated based on IDMS traceable, enzymatic creatinine  method using the CKD-EPI 2021 equation.   Total Protein  6.4 - 8.2 g/dL 7.5   Albumin  3.5 - 5.2 g/dL 4.4   Globulin  1.5 - 4.5 g/dL 3.1   A/G Ratio  1.1 - 2.5 RATIO 1.4   Calcium  8.4 - 10.2 mg/dL 10.0   Total Bilirubin  0.2 - 1.2 mg/dL 1.1   AST (SGOT)  5 - 34 U/L 16   ALT (SGPT)  0 - 55 U/L 13   Alkaline Phosphatase  40 - 150 U/L 106     Specimen Collected: 10/03/23 19:02    Performed by: [facility]  ([id number]) Last Resulted: 10/03/23 19:48   Received From: [facility]  Result Received: 10/12/23 12:53     Assessment & Plan   Diagnoses and all orders for this visit:    1. Palpitations (Primary)    2. Panic attack  -     escitalopram (Lexapro) 10 MG tablet; 1/2 po daily x 14days then 1 po daily  Dispense: 30 tablet; Refill: 5    3. SANG (generalized anxiety disorder)  -     escitalopram (Lexapro) 10 MG tablet; 1/2 po daily x 14days then 1 po  daily  Dispense: 30 tablet; Refill: 5    4. Primary hypertension    5. Adult hypothyroidism  -     levothyroxine (Synthroid) 112 MCG tablet; Take 1 tablet by mouth Daily.      Recheck thyroid ok, continue same dose  Palpitations - workup negative thus far;   will try new linwood as intolerant prior;     Bp better on antihtn, just had ccb added;  will have monitor closely;   continue work on weight loss  Off work while adjust medications                -Follow up: 4 to 6 weeks and prn

## 2023-10-13 NOTE — LETTER
October 13, 2023     Patient: Liz Salvador   YOB: 1970   Date of Visit: 10/13/2023       To Whom It May Concern:    It is my medical opinion that Liz Salvador should remain off work for next 13 weeks for continued evaluation of cardiac issues and medication management.     Sincerely,          Sage Ferrer DO    CC: No Recipients

## 2023-10-24 ENCOUNTER — TELEPHONE (OUTPATIENT)
Dept: FAMILY MEDICINE CLINIC | Facility: CLINIC | Age: 53
End: 2023-10-24
Payer: COMMERCIAL

## 2023-10-24 NOTE — TELEPHONE ENCOUNTER
Caller: Liz Salvador    Relationship: Self    Best call back number: [phone]     What was the call regarding:  PATIENT STATED THAT [facility] HAS FAXED REQUESTS FOR MEDICAL RECORDS AND THEY ARE NEEDING RECORDS DATING BACK TO 10/06/2023. PATIENT STATED THAT IN ORDER FOR HER TO BE PAID, THEY REQUIRE THESE RECORDS. PLEASE ADVISE.

## 2023-10-30 ENCOUNTER — TELEPHONE (OUTPATIENT)
Dept: FAMILY MEDICINE CLINIC | Facility: CLINIC | Age: 53
End: 2023-10-30

## 2023-10-30 ENCOUNTER — OFFICE VISIT (OUTPATIENT)
Dept: FAMILY MEDICINE CLINIC | Facility: CLINIC | Age: 53
End: 2023-10-30
Payer: COMMERCIAL

## 2023-10-30 DIAGNOSIS — F41.0 PANIC ATTACK: ICD-10-CM

## 2023-10-30 DIAGNOSIS — F41.1 GAD (GENERALIZED ANXIETY DISORDER): Primary | ICD-10-CM

## 2023-10-30 PROCEDURE — 99214 OFFICE O/P EST MOD 30 MIN: CPT | Performed by: FAMILY MEDICINE

## 2023-10-30 RX ORDER — LORAZEPAM 1 MG/1
TABLET ORAL
Qty: 15 TABLET | Refills: 0 | Status: SHIPPED | OUTPATIENT
Start: 2023-10-30

## 2023-10-30 RX ORDER — LORAZEPAM 1 MG/1
TABLET ORAL
Qty: 15 TABLET | Refills: 0 | Status: SHIPPED | OUTPATIENT
Start: 2023-10-30 | End: 2023-10-30 | Stop reason: SDUPTHER

## 2023-10-30 RX ORDER — SERTRALINE HYDROCHLORIDE 25 MG/1
TABLET, FILM COATED ORAL
Qty: 30 TABLET | Refills: 6 | Status: SHIPPED | OUTPATIENT
Start: 2023-10-30

## 2023-10-30 RX ORDER — LORAZEPAM 1 MG/1
TABLET ORAL
Qty: 1 TABLET | Refills: 0 | Status: SHIPPED | OUTPATIENT
Start: 2023-10-30 | End: 2023-10-30 | Stop reason: SDUPTHER

## 2023-10-30 NOTE — TELEPHONE ENCOUNTER
Caller: Liz Salvador    Relationship: Self    Best call back number: [phone]    Which medication are you concerned about: LORazepam (Ativan) 1 MG tablet     Who prescribed you this medication: SE[facility]O    When did you start taking this medication: N/A    What are your concerns: THEY ONLY GAVE HER 1 PILL.  SHE WAS SUPPOSED TO GET MORE.  PLEASE CALL TO ADVISE.

## 2023-10-30 NOTE — PROGRESS NOTES
Subjective   Liz Salvador is a 53 y.o. female. Presents today for   Chief Complaint   Patient presents with    Anxiety    Hypertension       History of Present Illness  Patient 52 y/o female with severe anxiety and panic attacks;  just took first dose of lexapro today and severe panic attack;  still having also palpitations, dyspnea with it.   Has up coming ECHO in few days;  Also just finishe Oculus VRer, awaiting results;   off work at this time until meds adjusted, cardiac work-up complete.   Her bp is doing better;  is working o weight loss;  bp up when first got here, but trending down on recheck.    Review of Systems   Respiratory:  Positive for shortness of breath.    Cardiovascular:  Positive for palpitations. Negative for chest pain and leg swelling.   Gastrointestinal:  Negative for abdominal pain.       Patient Active Problem List   Diagnosis    Adult hypothyroidism    Vitamin D deficiency    Mixed hyperlipidemia    Prehypertension    Clinical diagnosis of COVID-19       Social History     Socioeconomic History    Marital status:    Tobacco Use    Smoking status: Never    Smokeless tobacco: Never   Vaping Use    Vaping Use: Never used   Substance and Sexual Activity    Alcohol use: No    Drug use: No    Sexual activity: Defer       Allergies   Allergen Reactions    Propranolol Other (See Comments)     Bradycardia       Current Outpatient Medications on File Prior to Visit   Medication Sig Dispense Refill    amLODIPine (NORVASC) 5 MG tablet Take 1 tablet by mouth every night at bedtime. 30 tablet 1    escitalopram (Lexapro) 10 MG tablet 1/2 po daily x 14days then 1 po daily 30 tablet 5    fluticasone (FLONASE) 50 MCG/ACT nasal spray 2 sprays into the nostril(s) as directed by provider Daily.      levothyroxine (Synthroid) 112 MCG tablet Take 1 tablet by mouth Daily.      multivitamin (MULTI-VITAMIN DAILY PO) Take  by mouth Daily.      vitamin D3 125 MCG (5000 UT) capsule capsule Take 1 capsule by  "mouth Daily. 90 capsule 3     No current facility-administered medications on file prior to visit.       Objective   Vitals:    10/30/23 1353   BP: (!) 146/102   Pulse: 85   SpO2: 97%   Weight: 85.7 kg (189 lb)   Height: 160 cm (63\")     Body mass index is 33.48 kg/m².    Physical Exam  Vitals and nursing note reviewed.   Constitutional:       Appearance: She is well-developed.   HENT:      Head: Normocephalic and atraumatic.   Neck:      Thyroid: No thyromegaly.      Vascular: No JVD.   Cardiovascular:      Rate and Rhythm: Normal rate and regular rhythm.      Heart sounds: Normal heart sounds. No murmur heard.     No friction rub. No gallop.   Pulmonary:      Effort: Pulmonary effort is normal. No respiratory distress.      Breath sounds: Normal breath sounds. No wheezing or rales.   Abdominal:      General: Bowel sounds are normal. There is no distension.      Palpations: Abdomen is soft.      Tenderness: There is no abdominal tenderness. There is no guarding or rebound.   Musculoskeletal:      Cervical back: Neck supple.   Skin:     General: Skin is warm and dry.   Neurological:      Mental Status: She is alert.   Psychiatric:         Behavior: Behavior normal.         Assessment & Plan   Diagnoses and all orders for this visit:    1. SANG (generalized anxiety disorder) (Primary)  -     sertraline (Zoloft) 25 MG tablet; 1/2 po daily x 14d, then 1 po daily  Dispense: 30 tablet; Refill: 6    2. Panic attack  -     sertraline (Zoloft) 25 MG tablet; 1/2 po daily x 14d, then 1 po daily  Dispense: 30 tablet; Refill: 6  -     Discontinue: LORazepam (Ativan) 1 MG tablet; 1 po 1 to 2 hours prior ECHO  Dispense: 1 tablet; Refill: 0  -     Discontinue: LORazepam (Ativan) 1 MG tablet; 1 po 1 to 2 hours prior ECHO  and 1/2 to 1 daily prn panic attacks  Dispense: 15 tablet; Refill: 0      Mom relates did well on zoloft in past personally;  will try this instead since helped family;  OK take ativan prior to ECHO, will give few " for panic attacks while awaiting ativan to work.       José Luis reviewed        -Follow up: 2 weeks and prn

## 2023-11-05 VITALS
SYSTOLIC BLOOD PRESSURE: 140 MMHG | HEIGHT: 63 IN | OXYGEN SATURATION: 97 % | WEIGHT: 189 LBS | DIASTOLIC BLOOD PRESSURE: 80 MMHG | HEART RATE: 85 BPM | BODY MASS INDEX: 33.49 KG/M2

## 2023-11-13 ENCOUNTER — OFFICE VISIT (OUTPATIENT)
Dept: FAMILY MEDICINE CLINIC | Facility: CLINIC | Age: 53
End: 2023-11-13
Payer: COMMERCIAL

## 2023-11-13 ENCOUNTER — TELEPHONE (OUTPATIENT)
Dept: FAMILY MEDICINE CLINIC | Facility: CLINIC | Age: 53
End: 2023-11-13

## 2023-11-13 VITALS
BODY MASS INDEX: 32.78 KG/M2 | WEIGHT: 185 LBS | SYSTOLIC BLOOD PRESSURE: 130 MMHG | DIASTOLIC BLOOD PRESSURE: 88 MMHG | HEART RATE: 83 BPM | HEIGHT: 63 IN | OXYGEN SATURATION: 99 %

## 2023-11-13 DIAGNOSIS — R00.2 PALPITATIONS: ICD-10-CM

## 2023-11-13 DIAGNOSIS — I10 PRIMARY HYPERTENSION: ICD-10-CM

## 2023-11-13 DIAGNOSIS — F41.0 PANIC ATTACK: ICD-10-CM

## 2023-11-13 DIAGNOSIS — I51.7 LVH (LEFT VENTRICULAR HYPERTROPHY): ICD-10-CM

## 2023-11-13 DIAGNOSIS — F41.1 GAD (GENERALIZED ANXIETY DISORDER): Primary | ICD-10-CM

## 2023-11-13 RX ORDER — AMLODIPINE BESYLATE AND BENAZEPRIL HYDROCHLORIDE 5; 10 MG/1; MG/1
1 CAPSULE ORAL DAILY
Qty: 30 CAPSULE | Refills: 5 | Status: SHIPPED | OUTPATIENT
Start: 2023-11-13

## 2023-11-13 RX ORDER — LORAZEPAM 1 MG/1
TABLET ORAL
Qty: 15 TABLET | Refills: 2 | Status: SHIPPED | OUTPATIENT
Start: 2023-11-13

## 2023-11-13 NOTE — PROGRESS NOTES
Subjective   Liz Salvador is a 53 y.o. female. Presents today for   Chief Complaint   Patient presents with    Anxiety    Hypertension    Palpitations       History of Present Illness  Patient 52 y/o female that has relatively new diagnosis of hypertension, was having palpitations with chest pain and saw Cardiology.  Had event monitor, reports noted ectopy;  Had ECHO EF 66%, did note concentric LVH;  She also has developed panic disorder and SANG, very severe.   Has not been able to focus, complete tasks and concerns on palpitations as cardiac or anxiety induced.  Did have adverse reaction to lexapro, but doing ok on sertraline.To start full dose of sertarline 25mg tomorrow, has been taking ativan with past few doses and doing ok;  first 5 days more jittery.   Her bp is still running borderline and elevated mildly at times, though improved;   She is working on weight loss and progressively dropping  Review of Systems   Respiratory:  Positive for chest tightness. Negative for shortness of breath and wheezing.    Cardiovascular:  Positive for palpitations. Negative for chest pain.       Patient Active Problem List   Diagnosis    Adult hypothyroidism    Vitamin D deficiency    Mixed hyperlipidemia    Prehypertension    Clinical diagnosis of COVID-19       Social History     Socioeconomic History    Marital status:    Tobacco Use    Smoking status: Never    Smokeless tobacco: Never   Vaping Use    Vaping Use: Never used   Substance and Sexual Activity    Alcohol use: No    Drug use: No    Sexual activity: Defer       Allergies   Allergen Reactions    Propranolol Other (See Comments)     Bradycardia       Current Outpatient Medications on File Prior to Visit   Medication Sig Dispense Refill    amLODIPine (NORVASC) 5 MG tablet Take 1 tablet by mouth every night at bedtime. 30 tablet 1    fluticasone (FLONASE) 50 MCG/ACT nasal spray 2 sprays into the nostril(s) as directed by provider Daily.      levothyroxine  "(Synthroid) 112 MCG tablet Take 1 tablet by mouth Daily.      multivitamin (MULTI-VITAMIN DAILY PO) Take  by mouth Daily.      sertraline (Zoloft) 25 MG tablet 1/2 po daily x 14d, then 1 po daily 30 tablet 6    vitamin D3 125 MCG (5000 UT) capsule capsule Take 1 capsule by mouth Daily. 90 capsule 3    [DISCONTINUED] LORazepam (Ativan) 1 MG tablet 1 po 1 to 2 hours prior ECHO  and 1/2 to 1 daily prn panic attacks 15 tablet 0     No current facility-administered medications on file prior to visit.       Objective   Vitals:    11/13/23 1457   BP: 130/88   Pulse: 83   SpO2: 99%   Weight: 83.9 kg (185 lb)   Height: 160 cm (63\")     Body mass index is 32.77 kg/m².    Physical Exam  Vitals and nursing note reviewed.   Constitutional:       Appearance: She is well-developed.   HENT:      Head: Normocephalic and atraumatic.   Neck:      Thyroid: No thyromegaly.      Vascular: No JVD.   Cardiovascular:      Rate and Rhythm: Normal rate and regular rhythm.      Heart sounds: Normal heart sounds. No murmur heard.     No friction rub. No gallop.   Pulmonary:      Effort: Pulmonary effort is normal. No respiratory distress.      Breath sounds: Normal breath sounds. No wheezing or rales.   Abdominal:      General: Bowel sounds are normal. There is no distension.      Palpations: Abdomen is soft.      Tenderness: There is no abdominal tenderness. There is no guarding or rebound.   Musculoskeletal:      Cervical back: Neck supple.   Skin:     General: Skin is warm and dry.   Neurological:      Mental Status: She is alert.   Psychiatric:         Behavior: Behavior normal.         Narrative  Performed by [facility]  REVIEWING YOUR TEST RESULTS IN [facility] IS NOT A SUBSTITUTE FOR DISCUSSING THOSE RESULTS WITH YOUR HEALTH CARE PROVIDER.  PLEASE CONTACT YOUR PROVIDER [facility]IA [facility] TO DISCUSS ANY QUESTIONS OR CONCERNS YOU MAY HAVE REGARDING THESE TEST RESULTS.    CARDIOLOGY REPORT  FACILITY: [facility] " [place]    PATIENT NAME/: OSVALDO GIBBONS    1970  UNIT/AGE/GENDER:      AGE: 53 YR        GENDER: F  UNIT NUMBER: [id number]  ACCOUNT NUMBER: [id number]  ACCESSION NUMBER: [id number]    DATE OF EXAM: 2023  09:07  EXAMINATION(S): ECHO DOPPLER 2D MMODE SPECT COLOR COMPLETE    FINAL REPORT                                                         Procedure Information  Procedure type:        Echo  Proc. sub type:        TTE procedure: ECHO DOPPLER 2D MMODE SPECT COLOR COMPLETE.  Start date time:       11/3/2023                                   Blood pressure:     128 / 80 mmHg  Accession no:          [id number]    Procedure Staff  Referring Physician:       Gumaro Newton  Sonographer:               Azeb Weeks  Interpreting Physician: Rick Sims        Clinical Indications  Abnormal ECG and Palpitations.    Physician Conclusions  Any valve disease noted in the report is non-rheumatic unless otherwise specifically noted.  Summary:                 Technically difficult examination.                           The ejection fraction biplane was calculated at 66%.                           Overall left ventricular function is normal.                           No regional wall motion abnormalities noted.                           Mild concentric left ventricular hypertrophy.                           Trace mitral regurgitation is present.                           Right ventricular systolic pressure of 31 mmHg.                           Trace tricuspid regurgitation.  Findings  Left Ventricle:           The ejection fraction biplane was calculated at 66%.                            Left ventricle size is normal.                            Mild concentric left ventricular hypertrophy.                            Overall left ventricular function is normal.                            No regional wall motion abnormalities noted.                            Normal diastolic  filling pattern.  Left Atrium:              Left atrium is of normal size.  Right Ventricle:          The right ventricle is not adequately visualized.                            The right ventricular chamber size and systolic function are within normal limits.  Right Atrium:             The right atrial chamber size appears normal.  Aortic Valve:             The aortic valve leaflets were not well visualized.                            There is no evidence of aortic stenosis noted.                            No evidence of aortic valve regurgitation.  Mitral Valve:             Structurally normal mitral valve.                            There is no evidence of mitral valve prolapse noted.                            Trace mitral regurgitation is present.                            No evidence of mitral valve stenosis.  Tricuspid Valve:          Right ventricular systolic pressure of 31 mmHg.                            Trace tricuspid regurgitation.                            No evidence of tricuspid stenosis.                            Tricuspid valve was not well visualized.  Pulmonic Valve:           The pulmonic valve was not well visualized .                            No evidence of pulmonic insufficiency.                            No evidence of pulmonic stenosis.  Pericardium:              There is no evidence of pericardial effusion.  Aorta/Great Vessels: Aortic root is not well visualized.                       Aorta was not clearly visualized.                       Aortic root dimension within normal limits.  Assessment & Plan   Diagnoses and all orders for this visit:    1. SANG (generalized anxiety disorder) (Primary)  -     LORazepam (Ativan) 1 MG tablet; 11/2 to 1 daily prn panic attacks  Dispense: 15 tablet; Refill: 2    2. Palpitations    3. Primary hypertension  -     amLODIPine-benazepril (LOTREL 5-10) 5-10 MG per capsule; Take 1 capsule by mouth Daily.  Dispense: 30 capsule; Refill: 5    4. LVH  (left ventricular hypertrophy)  -     amLODIPine-benazepril (LOTREL 5-10) 5-10 MG per capsule; Take 1 capsule by mouth Daily.  Dispense: 30 capsule; Refill: 5    5. Panic attack  -     LORazepam (Ativan) 1 MG tablet; 11/2 to 1 daily prn panic attacks  Dispense: 15 tablet; Refill: 2    I would suggest adding an acei and to limit pill burden will try lotrel.  Discussed LVH and secondary to hypertension.  Anxiety and panic disorder, doing somewhat better, transition on dose tomorrow;  after tomorrow would take ativan prn  I would recommend off 2 more weeks to adjust bp and anxiety medications  For palpitations, will monitor; did not toelrate a beta blocker                 -Follow up: 2 weeks and prn

## 2023-11-13 NOTE — TELEPHONE ENCOUNTER
PT seen RRJ today and he wants her back in 2 weeks. She wants it to be on [place]. Please advise the best time to put her at on the 27th.

## 2023-11-13 NOTE — LETTER
November 13, 2023     Patient: Liz Salvador   YOB: 1970   Date of Visit: 11/13/2023       To Whom It May Concern:    It is my medical opinion that Liz Salvador should remain off work 2 more weeks for medication adjustment.  I will see back in 2 weeks to clear for return to work.           Sincerely,          Sage Ferrer DO    CC: No Recipients

## 2023-11-15 ENCOUNTER — TELEPHONE (OUTPATIENT)
Dept: FAMILY MEDICINE CLINIC | Facility: CLINIC | Age: 53
End: 2023-11-15
Payer: COMMERCIAL

## 2023-11-15 NOTE — TELEPHONE ENCOUNTER
Pt called with concerns about her BP and HR since starting the new combo medication for her BP. She said her BP got down to 104/80 and HR was 56. She stated her HR got low like that when she was on a beta blocker and you didn't like that so you took her off of it. I did explain that it's better to have a lower BP than a high BP and that unless it's making her feel sluggish then she's probably okay but that I'd let you know to see if you would like to make a change. Please advise on next steps. Thank you.

## 2023-11-16 NOTE — TELEPHONE ENCOUNTER
Pt informed and voiced understanding.     Anamika  Pt said that someone was supposed to be working her in to see Dr. Ferrer but she has never heard anything back. She is on disability right now and is due to go back to work on 11/28/23 but wanted to be seen before then. She had mentioned that we said we would work her in on the 27th but again, she never heard from anyone and she doesn't have any pending appts. Thanks.

## 2023-11-17 RX ORDER — HYDROXYZINE HYDROCHLORIDE 10 MG/1
10 TABLET, FILM COATED ORAL 3 TIMES DAILY PRN
Qty: 45 TABLET | Refills: 2 | Status: SHIPPED | OUTPATIENT
Start: 2023-11-17

## 2023-11-17 RX ORDER — CLONIDINE HYDROCHLORIDE 0.1 MG/1
TABLET ORAL
Qty: 60 TABLET | Refills: 1 | Status: SHIPPED | OUTPATIENT
Start: 2023-11-17 | End: 2023-11-20

## 2023-11-17 NOTE — TELEPHONE ENCOUNTER
Put pt on schedule for 11/28 @ 815. Pt states she is supposed to return back to work on that day so she needs Dr. Ferrer to state that on the paperwork she will not be able to return until after the 28th.     Pt is still not doing well. Pt crying on the phone. Anxiety is high as well as BP. Yesterday was 151/93. This morning was 30/91. PT states her pulse is not in the normal range as well. Med's don't seem to be helping. She felt terrible yesterday with diarrhea, jitteriness and fatigue.      Please advise.     [phone]

## 2023-11-20 DIAGNOSIS — E03.9 ADULT HYPOTHYROIDISM: ICD-10-CM

## 2023-11-20 RX ORDER — LEVOTHYROXINE SODIUM 112 UG/1
112 TABLET ORAL DAILY
Qty: 30 TABLET | Refills: 5 | Status: SHIPPED | OUTPATIENT
Start: 2023-11-20

## 2023-11-20 RX ORDER — CLONIDINE HYDROCHLORIDE 0.1 MG/1
TABLET ORAL
Qty: 180 TABLET | Refills: 1 | Status: SHIPPED | OUTPATIENT
Start: 2023-11-20

## 2023-11-20 NOTE — TELEPHONE ENCOUNTER
Pt informed and voiced understanding. She will give it a try. She is starting to think that the Zoloft may be what's making her jittery but she wants to give it a little more time to see if it subsides.

## 2023-11-28 ENCOUNTER — OFFICE VISIT (OUTPATIENT)
Dept: FAMILY MEDICINE CLINIC | Facility: CLINIC | Age: 53
End: 2023-11-28
Payer: COMMERCIAL

## 2023-11-28 VITALS
DIASTOLIC BLOOD PRESSURE: 92 MMHG | WEIGHT: 183.4 LBS | HEIGHT: 63 IN | OXYGEN SATURATION: 100 % | HEART RATE: 89 BPM | BODY MASS INDEX: 32.5 KG/M2 | SYSTOLIC BLOOD PRESSURE: 135 MMHG

## 2023-11-28 DIAGNOSIS — I51.7 LVH (LEFT VENTRICULAR HYPERTROPHY): ICD-10-CM

## 2023-11-28 DIAGNOSIS — I10 PRIMARY HYPERTENSION: ICD-10-CM

## 2023-11-28 DIAGNOSIS — F41.0 PANIC DISORDER: ICD-10-CM

## 2023-11-28 DIAGNOSIS — F41.1 GAD (GENERALIZED ANXIETY DISORDER): Primary | ICD-10-CM

## 2023-11-28 RX ORDER — AMLODIPINE BESYLATE AND BENAZEPRIL HYDROCHLORIDE 5; 20 MG/1; MG/1
1 CAPSULE ORAL DAILY
Qty: 90 CAPSULE | Refills: 3 | Status: SHIPPED | OUTPATIENT
Start: 2023-11-28

## 2023-11-28 NOTE — LETTER
November 28, 2023     Patient: Liz Salvador   YOB: 1970   Date of Visit: 11/28/2023       To Whom It May Concern:    It is my medical opinion that Liz Salvador should remain off work for 4 more weeks for further medication adjustment.  Likely will return soon, I will see back to clear.       Sincerely,          Sage Ferrer,     CC: No Recipients

## 2023-11-28 NOTE — PROGRESS NOTES
Subjective   Liz Salvador is a 53 y.o. female. Presents today for   Chief Complaint   Patient presents with    Hypertension    Anxiety    Palpitations       History of Present Illness  Patient 52 y/o female with linwood with panic disorder;  Panic attacks are lessening and slowly cutting back on ativan;  had to take today as panic attack;  BP doing better, but DBP still high at times;   No cp/soa, but has had palpitations still;  Did not tolerate beta blocker;  ECHO noted LVH and so needs to keep bp low;   Is tolerating sertralinebetter now and feels helping on low dose.      Review of Systems   Respiratory:  Negative for shortness of breath.    Cardiovascular:  Positive for palpitations. Negative for chest pain.   Psychiatric/Behavioral:  Positive for decreased concentration. Negative for self-injury. The patient is nervous/anxious.        Patient Active Problem List   Diagnosis    Adult hypothyroidism    Vitamin D deficiency    Mixed hyperlipidemia    Prehypertension    Clinical diagnosis of COVID-19       Social History     Socioeconomic History    Marital status:    Tobacco Use    Smoking status: Never    Smokeless tobacco: Never   Vaping Use    Vaping Use: Never used   Substance and Sexual Activity    Alcohol use: No    Drug use: No    Sexual activity: Defer       Allergies   Allergen Reactions    Propranolol Other (See Comments)     Bradycardia       Current Outpatient Medications on File Prior to Visit   Medication Sig Dispense Refill    amLODIPine-benazepril (LOTREL 5-10) 5-10 MG per capsule Take 1 capsule by mouth Daily. 30 capsule 5    cloNIDine (CATAPRES) 0.1 MG tablet TAKE 1 TABLET BY MOUTH TWICE DAILY AS NEEDED IF SYSTOLIC BLOOD PRESSURE>150 AND DIASTOLIC BLOOD PRESSURE> 100 180 tablet 1    fluticasone (FLONASE) 50 MCG/ACT nasal spray 2 sprays into the nostril(s) as directed by provider Daily.      levothyroxine (SYNTHROID, LEVOTHROID) 112 MCG tablet TAKE 1 TABLET BY MOUTH DAILY 30 tablet 5     "LORazepam (Ativan) 1 MG tablet 11/2 to 1 daily prn panic attacks 15 tablet 2    multivitamin (MULTI-VITAMIN DAILY PO) Take  by mouth Daily.      sertraline (Zoloft) 25 MG tablet 1/2 po daily x 14d, then 1 po daily 30 tablet 6    vitamin D3 125 MCG (5000 UT) capsule capsule Take 1 capsule by mouth Daily. 90 capsule 3    [DISCONTINUED] hydrOXYzine (ATARAX) 10 MG tablet Take 1 tablet by mouth 3 (Three) Times a Day As Needed for Anxiety. (Patient not taking: Reported on 11/28/2023) 45 tablet 2     No current facility-administered medications on file prior to visit.       Objective   Vitals:    11/28/23 0826   BP: 156/90   BP Location: Right arm   Patient Position: Sitting   Cuff Size: Adult   Pulse: 89   SpO2: 100%   Weight: 83.2 kg (183 lb 6.4 oz)   Height: 160 cm (63\")     Body mass index is 32.49 kg/m².    Physical Exam  Vitals and nursing note reviewed.   Constitutional:       Appearance: She is well-developed.   HENT:      Head: Normocephalic and atraumatic.   Neck:      Thyroid: No thyromegaly.      Vascular: No JVD.   Cardiovascular:      Rate and Rhythm: Normal rate and regular rhythm.      Heart sounds: Normal heart sounds. No murmur heard.     No friction rub. No gallop.   Pulmonary:      Effort: Pulmonary effort is normal. No respiratory distress.      Breath sounds: Normal breath sounds. No wheezing or rales.   Abdominal:      General: Bowel sounds are normal. There is no distension.      Palpations: Abdomen is soft.      Tenderness: There is no abdominal tenderness. There is no guarding or rebound.   Musculoskeletal:      Cervical back: Neck supple.   Skin:     General: Skin is warm and dry.   Neurological:      Mental Status: She is alert.   Psychiatric:         Mood and Affect: Mood is anxious.         Behavior: Behavior normal.         DATE OF EXAM: 11/03/2023  09:07   EXAMINATION(S): ECHO DOPPLER 2D MMODE SPECT COLOR COMPLETE     FINAL REPORT                                                          " Procedure Information   Procedure type:        Echo   Proc. sub type:        TTE procedure: ECHO DOPPLER 2D MMODE SPECT COLOR COMPLETE.   Start date time:       11/3/2023                                   Blood pressure:     128 / 80 mmHg   Accession no:          [id number]     Procedure Staff   Referring Physician:       Gumaro Newton   Sonographer:               Azeb Weeks   Interpreting Physician: Rick Sims         Clinical Indications   Abnormal ECG and Palpitations.     Physician Conclusions   Any valve disease noted in the report is non-rheumatic unless otherwise specifically noted.   Summary:                 Technically difficult examination.                            The ejection fraction biplane was calculated at 66%.                            Overall left ventricular function is normal.                            No regional wall motion abnormalities noted.                            Mild concentric left ventricular hypertrophy.                            Trace mitral regurgitation is present.                            Right ventricular systolic pressure of 31 mmHg.                            Trace tricuspid regurgitation.   Findings   Left Ventricle:           The ejection fraction biplane was calculated at 66%.                             Left ventricle size is normal.                             Mild concentric left ventricular hypertrophy.                             Overall left ventricular function is normal.                             No regional wall motion abnormalities noted.                             Normal diastolic filling pattern.   Left Atrium:              Left atrium is of normal size.   Right Ventricle:          The right ventricle is not adequately visualized.                             The right ventricular chamber size and systolic function are within normal limits.   Right Atrium:             The right atrial chamber size appears normal.   Aortic Valve:              The aortic valve leaflets were not well visualized.                             There is no evidence of aortic stenosis noted.                             No evidence of aortic valve regurgitation.   Mitral Valve:             Structurally normal mitral valve.                             There is no evidence of mitral valve prolapse noted.                             Trace mitral regurgitation is present.                             No evidence of mitral valve stenosis.   Tricuspid Valve:          Right ventricular systolic pressure of 31 mmHg.                             Trace tricuspid regurgitation.                             No evidence of tricuspid stenosis.                             Tricuspid valve was not well visualized.   Pulmonic Valve:           The pulmonic valve was not well visualized .                             No evidence of pulmonic insufficiency.                             No evidence of pulmonic stenosis.   Pericardium:              There is no evidence of pericardial effusion.   Aorta/Great Vessels: Aortic root is not well visualized.                        Aorta was not clearly visualized.                        Aortic root dimension within normal limits.     TSH  Order: [id number]   suggestion  Result Information displayed in this report will not trend and may not trigger automated decision support.     Component  Ref Range & Units 1 mo ago   Thyroid Stimulating Hormone  0.350 - 4.940 m(iU)/L 2.510     Specimen Collected: 10/04/23 00:14    Performed by: [facility]  ([id number]) Last Resulted: 10/04/23 01:16   Received From: [facility]  Result Received: 10/12/23 12:53    View Encounter        Received Information  T4, FREE  Order: [id number]  Collected 10/4/2023 00:14    Specimen Comment: Specimen type and source: Serum, Blood       Component  Ref Range & Units 1 mo ago   Free T4  0.7 - 1.48 ng/dL 1.07        View Full Report        Specimen Collected: 10/04/23 00:14 EDT     Performed By: [facility]  ([id number]) Last Resulted: 10/04/23 01:16 EDT   Received From: [facility]  Result Received: 10/12/23 12:53       Received Information                TROPONIN  Order: [id number]  Collected 10/3/2023 21:28    Specimen Information: Fresh Frozen Plasma   Specimen Comment: Specimen type and source: Plasma, Blood       Component  Ref Range & Units 1 mo ago   Troponin I  0.000 - 0.028 ng/mL <0.010   Comment: (note)  Levels >0.028 are greater than the 99th percentile and suggest  possible need for clinical correlation and serial testing.        View Full Report        Specimen Collected: 10/03/23 21:28 EDT    Performed By: [facility]  ([id number]) Last Resulted: 10/03/23 22:16 EDT   Received From: [facility]  Result Received: 10/12/23 12:53       Received Information                TROPONIN  Order: [id number]  Collected 10/3/2023 19:02    Specimen Information: Fresh Frozen Plasma   Specimen Comment: Specimen type and source: Plasma, Blood       Component  Ref Range & Units 1 mo ago   Troponin I  0.000 - 0.028 ng/mL <0.010   Comment: (note)  Levels >0.028 are greater than the 99th percentile and suggest  possible need for clinical correlation and serial testing.        View Full Report        Specimen Collected: 10/03/23 19:02 EDT    Performed By: [facility]  ([id number]) Last Resulted: 10/03/23 19:48 EDT   Received From: [facility]  Result Received: 10/13/23 10:40       Received Information                PROBNP (REFERENCE)  Order: [id number]  Collected 10/3/2023 19:02    Specimen Information: Fresh Frozen Plasma   Specimen Comment: Specimen type and source: Plasma, Blood       Component  Ref Range & Units 1 mo ago   BNP  0 - 119 pg/mL 22.8        View Full Report        Specimen Collected: 10/03/23 19:02 EDT    Performed By: [facility]  ([id number]) Last Resulted: 10/03/23 19:48 EDT   Received From: [facility]  Result Received: 10/12/23 12:53       Received  Information                 Contains abnormal data APTT  Order: [id number]  Collected 10/3/2023 19:02    Specimen Information: Fresh Frozen Plasma   Specimen Comment: Specimen type and source: Plasma, Blood       Component  Ref Range & Units 1 mo ago   PTT  25.1 - 36.5 s 38.1 High    Comment: (note)  Anticoagulants may alter the results of Laboratory  Coagulation assays. New-generation anticoagulants such as  direct Thrombin inhibitors (Dabigatran/Pradaxa, Argatroban,  Bivalrudin) and direct/indirect factor Xa inhibitors  (Rivaroxaban/Xarelto,Apixaban/Eliquis, Danaparoid/Orgaran,  Fondaparinux/Arixtra) have been shown to affect the results  of Laboratory Coagulation assays, creating falsely elevated  or decreased values. Correlation with medication history is  advised.        View Full Report        Specimen Collected: 10/03/23 19:02 EDT    Performed By: [facility]  ([id number]) Last Resulted: 10/03/23 19:34 EDT   Received From: [facility]  Result Received: 10/12/23 12:53       Received Information                PROTIME-INR  Order: [id number]  Collected 10/3/2023 19:02    Specimen Information: Fresh Frozen Plasma   Specimen Comment: Specimen type and source: Plasma, Blood       Component  Ref Range & Units 1 mo ago   Protime  10.3 - 13.3 s 11.5   Comment: (note)  Anticoagulants may alter the results of Laboratory  Coagulation assays. New-generation anticoagulants such as  direct Thrombin inhibitors (Dabigatran/Pradaxa, Argatroban,  Bivalrudin) and direct/indirect factor Xa inhibitors  (Rivaroxaban/Xarelto,Apixaban/Eliquis, Danaparoid/Orgaran,  Fondaparinux/Arixtra) have been shown to affect the results  of Laboratory Coagulation assays, creating falsely elevated  or decreased values. Correlation with medication history is  advised.   INR  INR 1.1   Comment: INR Therapeutic Ranges:  Low Intensity Range: 2.0-3.0  High Intensity Range:  3.0-4.5        View Full Report        Specimen Collected: 10/03/23 19:02  EDT    Performed By: [facility]  ([id number]) Last Resulted: 10/03/23 19:34 EDT   Received From: [facility]  Result Received: 10/12/23 12:53       Received Information                CBC AND DIFFERENTIAL  Order: [id number]  Collected 10/3/2023 19:02    Specimen Comment: Specimen type and source: Whole Blood, Blood       Component  Ref Range & Units 1 mo ago   WBC  4.5 - 11.0 10*3/uL 8.08   RBC  4.0 - 5.2 10*6/uL 4.74   Hemoglobin  12.0 - 16.0 g/dL 14.5   Hematocrit  36.0 - 46.0 % 43.1   MCV  80.0 - 100.0 fL 90.9   MCH  26.0 - 34.0 pg 30.6   MCHC  31.0 - 37.0 g/dL 33.6   RDW  12.0 - 16.8 % 12.8   Platelets  140 - 440 10*3/uL 256   MPV  8.4 - 12.4 fL 9.8   Differential Type  (arb'U) Hospital CBC w/AutoDiff   Neutrophil Rel %  45 - 80 % 64.3   Lymphocyte Rel %  15 - 50 % 28.5   Monocyte Rel %  0 - 15 % 5.0   Eosinophil %  0 - 7 % 1.5   Basophil Rel %  0 - 2 % 0.5   Immature Grans %  0.0 - 1.0 % 0.2   nRBC  0 /100(WBC) 0   Neutrophils Absolute  2.0 - 8.8 10*3/uL 5.20   Lymphocytes Absolute  0.7 - 5.5 10*3/uL 2.30   Monocytes Absolute  0.0 - 1.7 10*3/uL 0.40   Eosinophils Absolute  0.0 - 0.8 10*3/uL 0.12   Basophils Absolute  0.0 - 0.2 10*3/uL 0.04   Immature Grans, Absolute  0.00 - 0.10 10*3/uL 0.02        View Full Report        Specimen Collected: 10/03/23 19:02 EDT    Performed By: [facility]  ([id number]) Last Resulted: 10/03/23 19:36 EDT   Received From: [facility]  Result Received: 10/12/23 12:53       Received Information                MAGNESIUM  Order: [id number]  Collected 10/3/2023 19:02    Specimen Information: Fresh Frozen Plasma   Specimen Comment: Specimen type and source: Plasma, Blood       Component  Ref Range & Units 1 mo ago   Magnesium  1.6 - 2.6 mg/dL 1.9        View Full Report        Specimen Collected: 10/03/23 19:02 EDT    Performed By: [facility]  ([id number]) Last Resulted: 10/03/23 19:48 EDT   Received From: [facility]  Result Received: 10/12/23 12:53       Received  Information                 Contains abnormal data COMPREHENSIVE METABOLIC PANEL  Order: [id number]   suggestion  Result Information displayed in this report will not trend and may not trigger automated decision support.     Component  Ref Range & Units 1 mo ago   Sodium  136 - 145 mmol/L 144   Comment: (note)  Excess protein and/or lipids can falsely decrease sodium levels  (pseudo hyponatremia).   Potassium  3.5 - 5.1 mmol/L 3.8   Comment: Falsely elevated potassium can occur in patients with high WBC or platelet counts.   Chloride  98 - 107 mmol/L 107   Comment: (note)  Falsely elevated chloride levels can be seen in patients taking  medications which contain bromide.   Total CO2  22 - 29 mmol/L 23   Anion Gap  5 - 13 (arb'U) 14 High    Comment: (note)  Calculation- Na - (Cl + CO2)   Glucose  71 - 139 mg/dL 96   Comment: (note)  Reference range based on inpatient hypo/hyperglycemic treatment  levels. A random glucose =/>200 is concerning for poor control.   BUN  10 - 20 mg/dL 11   Creatinine  0.55 - 1.02 mg/dL 0.98   BUN/Creatinine Ratio  RATIO 11.2   Estimated GFR (Cr)  >60 /1.73 m2 68   Comment: (note)  eGFR calculated based on IDMS traceable, enzymatic creatinine  method using the CKD-EPI 2021 equation.   Total Protein  6.4 - 8.2 g/dL 7.5   Albumin  3.5 - 5.2 g/dL 4.4   Globulin  1.5 - 4.5 g/dL 3.1   A/G Ratio  1.1 - 2.5 RATIO 1.4   Calcium  8.4 - 10.2 mg/dL 10.0   Total Bilirubin  0.2 - 1.2 mg/dL 1.1   AST (SGOT)  5 - 34 U/L 16   ALT (SGPT)  0 - 55 U/L 13   Alkaline Phosphatase  40 - 150 U/L 106     Specimen Collected: 10/03/23 19:02    Performed by: [facility]  ([id number]) Last Resulted: 10/03/23 19:48   Received From: [facility]  Result Received: 10/12/23 12:53     Assessment & Plan   Diagnoses and all orders for this visit:    1. SANG (generalized anxiety disorder) (Primary)  -     sertraline (Zoloft) 50 MG tablet; Take 1 tablet by mouth Daily.  Dispense: 90 tablet; Refill: 3    2. Panic disorder  -      sertraline (Zoloft) 50 MG tablet; Take 1 tablet by mouth Daily.  Dispense: 90 tablet; Refill: 3    3. Primary hypertension  -     amLODIPine-benazepril (LOTREL 5-20) 5-20 MG per capsule; Take 1 capsule by mouth Daily.  Dispense: 90 capsule; Refill: 3    4. LVH (left ventricular hypertrophy)  -     amLODIPine-benazepril (LOTREL 5-20) 5-20 MG per capsule; Take 1 capsule by mouth Daily.  Dispense: 90 capsule; Refill: 3    BP improved, but not goal;  is consistently losing weight which should help;  Will increase benazperil portion of lotrel from 5/10mg to 5/20mg;   Given LVH, need make sure keep controlled;   d/w further weight loss, may be able to cut back.  Bernadette and panic attacks better, but still needing ativan;  will increase sertraline to 50mg and recommend off work for another 4 weeks to acclimate to medications and off ativan.   I suspect will be able to return in 4 weeks, will see back to clear.                  -Follow up: 4 weeks and prn

## 2023-11-30 ENCOUNTER — TELEPHONE (OUTPATIENT)
Dept: FAMILY MEDICINE CLINIC | Facility: CLINIC | Age: 53
End: 2023-11-30
Payer: COMMERCIAL

## 2023-11-30 NOTE — TELEPHONE ENCOUNTER
Caller: Liz Salvador    Relationship: Self    Best call back number: [phone]    PATIENT IS CURRENTLY ON DISABILITY.  SHE WAS CONTACTED BY [facility] TODAY TO CONFIRM THEY DID RECEIVE OUR LETTER ABOUT HER BEING OFF FOR ANOTHER 4 WEEKS DUE TO HER DISABILITY.       [facility] DID NOT RECEIVE ANY CLINICAL DOCUMENTATION THEY NEED TO SUPPORT THIS.    THEY NEED US TO SEND CLINICAL DOCUMENTATION THAT PATIENT'S BLOOD WORK WAS STILL ELEVATED DURING HER LAST VISIT AND THAT WE MADE CHANGES TO HER MEDICATIONS DUE TO THIS ELEVATED PRESSURE AND WHAT THE OTHER CLINICAL NOTES WERE TO SUPPORT THIS ADDITIONAL LEAVE.    PLEASE ADVISE

## 2023-12-08 ENCOUNTER — TELEPHONE (OUTPATIENT)
Dept: FAMILY MEDICINE CLINIC | Facility: CLINIC | Age: 53
End: 2023-12-08
Payer: COMMERCIAL

## 2023-12-08 NOTE — TELEPHONE ENCOUNTER
Caller: Lzi Salvador    Relationship to patient: Self    Best call back number: [phone]      Patient is needing: PATIENT STATES THAT SHE WANTED TO LET DR. LACHELLE ELKINS KNOW THAT [facility] IS SENDING A FAX TO THE OFFICE TODAY 12/8/23 REQUESTING FURTHER INFORMATION REGARDING PATIENT'S EXTENSION TO BE OFF WORK.      PLEASE ADVISE.

## 2023-12-14 ENCOUNTER — TELEPHONE (OUTPATIENT)
Dept: FAMILY MEDICINE CLINIC | Facility: CLINIC | Age: 53
End: 2023-12-14
Payer: COMMERCIAL

## 2023-12-14 NOTE — TELEPHONE ENCOUNTER
Caller: Darby with [employer]     Requesting office visit notes from 11/28 for Claim #[id number]. I will be faxing them to [phone]

## 2023-12-26 ENCOUNTER — OFFICE VISIT (OUTPATIENT)
Dept: FAMILY MEDICINE CLINIC | Facility: CLINIC | Age: 53
End: 2023-12-26
Payer: COMMERCIAL

## 2023-12-26 ENCOUNTER — TELEPHONE (OUTPATIENT)
Dept: FAMILY MEDICINE CLINIC | Facility: CLINIC | Age: 53
End: 2023-12-26

## 2023-12-26 VITALS
WEIGHT: 181 LBS | HEART RATE: 88 BPM | HEIGHT: 63 IN | DIASTOLIC BLOOD PRESSURE: 82 MMHG | SYSTOLIC BLOOD PRESSURE: 120 MMHG | OXYGEN SATURATION: 97 % | BODY MASS INDEX: 32.07 KG/M2

## 2023-12-26 DIAGNOSIS — F41.1 GAD (GENERALIZED ANXIETY DISORDER): Primary | ICD-10-CM

## 2023-12-26 DIAGNOSIS — I51.7 LVH (LEFT VENTRICULAR HYPERTROPHY): ICD-10-CM

## 2023-12-26 DIAGNOSIS — I10 PRIMARY HYPERTENSION: ICD-10-CM

## 2023-12-26 RX ORDER — AMLODIPINE BESYLATE AND BENAZEPRIL HYDROCHLORIDE 10; 20 MG/1; MG/1
1 CAPSULE ORAL DAILY
Qty: 90 CAPSULE | Refills: 1 | Status: SHIPPED | OUTPATIENT
Start: 2023-12-26

## 2023-12-26 NOTE — LETTER
December 26, 2023     Patient: Liz Salvador   YOB: 1970   Date of Visit: 12/26/2023       To Whom It May Concern:    It is my medical opinion that Liz Salvador may return to work December 27, 2023 without restrictions.       Sincerely,          Sage Ferrer DO    CC: No Recipients

## 2023-12-26 NOTE — TELEPHONE ENCOUNTER
Can lab orders be put in for pt to come in on 03-18-24?    She has an apt on 03- later in the day and wants to do labs prior so she does not have to go most the day without eating.

## 2023-12-26 NOTE — PROGRESS NOTES
Subjective   Liz Salvador is a 53 y.o. female. Presents today for   Chief Complaint   Patient presents with    Anxiety    Hypertension       Anxiety  Presents for follow-up visit. Symptoms include decreased concentration, excessive worry, nervous/anxious behavior and panic. Primary symptoms comment: took lorazepam this am;  more anxious about going back.. Symptoms occur most days. The quality of sleep is fair. Nighttime awakenings: occasional.     Compliance with medications is %. Treatment side effects: doing better, no side effects.   Hypertension  This is a chronic problem. The current episode started more than 1 month ago. The problem has been gradually improving since onset. The problem is controlled. Associated symptoms include anxiety. Current antihypertension treatment includes ACE inhibitors and calcium channel blockers. The current treatment provides moderate improvement. There is no history of kidney disease, CVA, heart failure or PVD.     Had 1 epidose of heart racing, but may have taken more than 1 thyroid med by mistake.     Review of Systems   Psychiatric/Behavioral:  Positive for decreased concentration. The patient is nervous/anxious.        Patient Active Problem List   Diagnosis    Adult hypothyroidism    Vitamin D deficiency    Mixed hyperlipidemia    Prehypertension    Clinical diagnosis of COVID-19       Social History     Socioeconomic History    Marital status:    Tobacco Use    Smoking status: Never    Smokeless tobacco: Never   Vaping Use    Vaping Use: Never used   Substance and Sexual Activity    Alcohol use: No    Drug use: No    Sexual activity: Defer       Allergies   Allergen Reactions    Propranolol Other (See Comments)     Bradycardia       Current Outpatient Medications on File Prior to Visit   Medication Sig Dispense Refill    cloNIDine (CATAPRES) 0.1 MG tablet TAKE 1 TABLET BY MOUTH TWICE DAILY AS NEEDED IF SYSTOLIC BLOOD PRESSURE>150 AND DIASTOLIC BLOOD  "PRESSURE> 100 180 tablet 1    fluticasone (FLONASE) 50 MCG/ACT nasal spray 2 sprays into the nostril(s) as directed by provider Daily.      levothyroxine (SYNTHROID, LEVOTHROID) 112 MCG tablet TAKE 1 TABLET BY MOUTH DAILY 30 tablet 5    LORazepam (Ativan) 1 MG tablet 11/2 to 1 daily prn panic attacks 15 tablet 2    multivitamin (MULTI-VITAMIN DAILY PO) Take  by mouth Daily.      sertraline (Zoloft) 50 MG tablet Take 1 tablet by mouth Daily. 90 tablet 3    vitamin D3 125 MCG (5000 UT) capsule capsule Take 1 capsule by mouth Daily. 90 capsule 3    [DISCONTINUED] amLODIPine-benazepril (LOTREL 5-20) 5-20 MG per capsule Take 1 capsule by mouth Daily. 90 capsule 3     No current facility-administered medications on file prior to visit.       Objective   Vitals:    12/26/23 1136   BP: 148/88   Pulse: 88   SpO2: 97%   Weight: 82.1 kg (181 lb)   Height: 160 cm (63\")     Body mass index is 32.06 kg/m².    Physical Exam  Vitals and nursing note reviewed.   Constitutional:       Appearance: Normal appearance. She is well-developed. She is not toxic-appearing or diaphoretic.   HENT:      Head: Normocephalic and atraumatic.   Neck:      Thyroid: No thyromegaly.      Vascular: No JVD.   Cardiovascular:      Rate and Rhythm: Normal rate and regular rhythm.      Heart sounds: Normal heart sounds. No murmur heard.     No friction rub. No gallop.   Pulmonary:      Effort: Pulmonary effort is normal. No respiratory distress.      Breath sounds: Normal breath sounds. No wheezing or rales.   Abdominal:      General: Bowel sounds are normal. There is no distension.      Palpations: Abdomen is soft.      Tenderness: There is no abdominal tenderness. There is no guarding or rebound.   Musculoskeletal:      Cervical back: Neck supple.   Skin:     General: Skin is warm and dry.      Capillary Refill: Capillary refill takes less than 2 seconds.   Neurological:      Mental Status: She is alert.   Psychiatric:         Mood and Affect: Mood " normal.         Behavior: Behavior normal.     TSH  Order: [id number]   suggestion  Result Information displayed in this report will not trend and may not trigger automated decision support.     Component  Ref Range & Units 2 mo ago   Thyroid Stimulating Hormone  0.350 - 4.940 m(iU)/L 2.510     Specimen Collected: 10/04/23 00:14    Performed by: [facility]  ([id number]) Last Resulted: 10/04/23 01:16   Received From: [email or web address]  Result Received: 10/12/23 12:53    View Encounter        Received Information  T4, FREE  Order: [id number]  Collected 10/4/2023 00:14    Specimen Comment: Specimen type and source: Serum, Blood       Component  Ref Range & Units 2 mo ago   Free T4  0.7 - 1.48 ng/dL 1.07        View Full Report        Specimen Collected: 10/04/23 00:14 EDT    Performed By: [facility]  ([id number]) Last Resulted: 10/04/23 01:16 EDT   Received From: [facility] [employer]  Result Received: 10/12/23 12:53       Received Information                TROPONIN  Order: [id number]  Collected 10/3/2023 21:28    Specimen Information: Fresh Frozen Plasma   Specimen Comment: Specimen type and source: Plasma, Blood       Component  Ref Range & Units 2 mo ago   Troponin I  0.000 - 0.028 ng/mL <0.010   Comment: (note)  Levels >0.028 are greater than the 99th percentile and suggest  possible need for clinical correlation and serial testing.        View Full Report        Specimen Collected: 10/03/23 21:28 EDT    Performed By: [facility]  ([id number]) Last Resulted: 10/03/23 22:16 EDT   Received From: [email or web address]  Result Received: 10/12/23 12:53       Received Information                TROPONIN  Order: [id number]  Collected 10/3/2023 19:02    Specimen Information: Fresh Frozen Plasma   Specimen Comment: Specimen type and source: Plasma, Blood       Component  Ref Range & Units 2 mo ago   Troponin I  0.000 - 0.028 ng/mL <0.010   Comment: (note)  Levels >0.028 are greater than the 99th percentile and suggest  possible need  for clinical correlation and serial testing.        View Full Report        Specimen Collected: 10/03/23 19:02 EDT    Performed By: [facility]  ([id number]) Last Resulted: 10/03/23 19:48 EDT   Received From: [employer]  Result Received: 10/13/23 10:40       Received Information                PROBNP (REFERENCE)  Order: [id number]  Collected 10/3/2023 19:02    Specimen Information: Fresh Frozen Plasma   Specimen Comment: Specimen type and source: Plasma, Blood       Component  Ref Range & Units 2 mo ago   BNP  0 - 119 pg/mL 22.8        View Full Report        Specimen Collected: 10/03/23 19:02 EDT    Performed By: [facility]  ([id number]) Last Resulted: 10/03/23 19:48 EDT   Received From: [employer]  Result Received: 10/12/23 12:53       Received Information                 Contains abnormal data APTT  Order: [id number]  Collected 10/3/2023 19:02    Specimen Information: Fresh Frozen Plasma   Specimen Comment: Specimen type and source: Plasma, Blood       Component  Ref Range & Units 2 mo ago   PTT  25.1 - 36.5 s 38.1 High    Comment: (note)  Anticoagulants may alter the results of Laboratory  Coagulation assays. New-generation anticoagulants such as  direct Thrombin inhibitors (Dabigatran/Pradaxa, Argatroban,  Bivalrudin) and direct/indirect factor Xa inhibitors  (Rivaroxaban/Xarelto,Apixaban/Eliquis, Danaparoid/Orgaran,  Fondaparinux/Arixtra) have been shown to affect the results  of Laboratory Coagulation assays, creating falsely elevated  or decreased values. Correlation with medication history is  advised.        View Full Report        Specimen Collected: 10/03/23 19:02 EDT    Performed By: [facility]  ([id number]) Last Resulted: 10/03/23 19:34 EDT   Received From: [employer]  Result Received: 10/12/23 12:53       Received Information                PROTIME-INR  Order: [id number]  Collected 10/3/2023 19:02    Specimen Information: Fresh Frozen Plasma   Specimen Comment: Specimen type and  source: Plasma, Blood       Component  Ref Range & Units 2 mo ago   Protime  10.3 - 13.3 s 11.5   Comment: (note)  Anticoagulants may alter the results of Laboratory  Coagulation assays. New-generation anticoagulants such as  direct Thrombin inhibitors (Dabigatran/Pradaxa, Argatroban,  Bivalrudin) and direct/indirect factor Xa inhibitors  (Rivaroxaban/Xarelto,Apixaban/Eliquis, Danaparoid/Orgaran,  Fondaparinux/Arixtra) have been shown to affect the results  of Laboratory Coagulation assays, creating falsely elevated  or decreased values. Correlation with medication history is  advised.   INR  INR 1.1   Comment: INR Therapeutic Ranges:  Low Intensity Range: 2.0-3.0  High Intensity Range:  3.0-4.5        View Full Report        Specimen Collected: 10/03/23 19:02 EDT    Performed By: [facility]  ([id number]) Last Resulted: 10/03/23 19:34 EDT   Received From: [facility]  Result Received: 10/12/23 12:53       Received Information                CBC AND DIFFERENTIAL  Order: [id number]  Collected 10/3/2023 19:02    Specimen Comment: Specimen type and source: Whole Blood, Blood       Component  Ref Range & Units 2 mo ago   WBC  4.5 - 11.0 10*3/uL 8.08   RBC  4.0 - 5.2 10*6/uL 4.74   Hemoglobin  12.0 - 16.0 g/dL 14.5   Hematocrit  36.0 - 46.0 % 43.1   MCV  80.0 - 100.0 fL 90.9   MCH  26.0 - 34.0 pg 30.6   MCHC  31.0 - 37.0 g/dL 33.6   RDW  12.0 - 16.8 % 12.8   Platelets  140 - 440 10*3/uL 256   MPV  8.4 - 12.4 fL 9.8   Differential Type  (arb'U) Hospital CBC w/AutoDiff   Neutrophil Rel %  45 - 80 % 64.3   Lymphocyte Rel %  15 - 50 % 28.5   Monocyte Rel %  0 - 15 % 5.0   Eosinophil %  0 - 7 % 1.5   Basophil Rel %  0 - 2 % 0.5   Immature Grans %  0.0 - 1.0 % 0.2   nRBC  0 /100(WBC) 0   Neutrophils Absolute  2.0 - 8.8 10*3/uL 5.20   Lymphocytes Absolute  0.7 - 5.5 10*3/uL 2.30   Monocytes Absolute  0.0 - 1.7 10*3/uL 0.40   Eosinophils Absolute  0.0 - 0.8 10*3/uL 0.12   Basophils Absolute  0.0 - 0.2 10*3/uL 0.04    Immature Grans, Absolute  0.00 - 0.10 10*3/uL 0.02        View Full Report        Specimen Collected: 10/03/23 19:02 EDT    Performed By: [facility]  ([id number]) Last Resulted: 10/03/23 19:36 EDT   Received From: [facility] [employer]  Result Received: 10/12/23 12:53       Received Information                MAGNESIUM  Order: [id number]  Collected 10/3/2023 19:02    Specimen Information: Fresh Frozen Plasma   Specimen Comment: Specimen type and source: Plasma, Blood       Component  Ref Range & Units 2 mo ago   Magnesium  1.6 - 2.6 mg/dL 1.9        View Full Report        Specimen Collected: 10/03/23 19:02 EDT    Performed By: [facility]  ([id number]) Last Resulted: 10/03/23 19:48 EDT   Received From: [facility]  Result Received: 10/12/23 12:53       Received Information                 Contains abnormal data COMPREHENSIVE METABOLIC PANEL  Order: [id number]   suggestion  Result Information displayed in this report will not trend and may not trigger automated decision support.     Component  Ref Range & Units 2 mo ago   Sodium  136 - 145 mmol/L 144   Comment: (note)  Excess protein and/or lipids can falsely decrease sodium levels  (pseudo hyponatremia).   Potassium  3.5 - 5.1 mmol/L 3.8   Comment: Falsely elevated potassium can occur in patients with high WBC or platelet counts.   Chloride  98 - 107 mmol/L 107   Comment: (note)  Falsely elevated chloride levels can be seen in patients taking  medications which contain bromide.   Total CO2  22 - 29 mmol/L 23   Anion Gap  5 - 13 (arb'U) 14 High    Comment: (note)  Calculation- Na - (Cl + CO2)   Glucose  71 - 139 mg/dL 96   Comment: (note)  Reference range based on inpatient hypo/hyperglycemic treatment  levels. A random glucose =/>200 is concerning for poor control.   BUN  10 - 20 mg/dL 11   Creatinine  0.55 - 1.02 mg/dL 0.98   BUN/Creatinine Ratio  RATIO 11.2   Estimated GFR (Cr)  >60 /1.73 m2 68   Comment: (note)  eGFR calculated based on IDMS traceable,  enzymatic creatinine  method using the CKD-EPI 2021 equation.   Total Protein  6.4 - 8.2 g/dL 7.5   Albumin  3.5 - 5.2 g/dL 4.4   Globulin  1.5 - 4.5 g/dL 3.1   A/G Ratio  1.1 - 2.5 RATIO 1.4   Calcium  8.4 - 10.2 mg/dL 10.0   Total Bilirubin  0.2 - 1.2 mg/dL 1.1   AST (SGOT)  5 - 34 U/L 16   ALT (SGPT)  0 - 55 U/L 13   Alkaline Phosphatase  40 - 150 U/L 106     Specimen Collected: 10/03/23 19:02    Performed by: [facility]  ([id number]) Last Resulted: 10/03/23 19:48   Received From: [facility]  Result Received: 12/26/23 11:30    View Encounter        Re    Assessment & Plan   Diagnoses and all orders for this visit:    1. SANG (generalized anxiety disorder) (Primary)    2. Primary hypertension  -     amLODIPine-benazepril (Lotrel) 10-20 MG per capsule; Take 1 capsule by mouth Daily.  Dispense: 90 capsule; Refill: 1    3. LVH (left ventricular hypertrophy)  -     amLODIPine-benazepril (Lotrel) 10-20 MG per capsule; Take 1 capsule by mouth Daily.  Dispense: 90 capsule; Refill: 1      Patient doing ok and improved;   Bp log not goal with DBP 90s at times;  will go up on lotreal 5/20 to 10/20mg daily  Sang and panic attacks stable, ok return to work tomorrow                   -Follow up: 3 months and prn

## 2023-12-29 ENCOUNTER — TELEPHONE (OUTPATIENT)
Dept: FAMILY MEDICINE CLINIC | Facility: CLINIC | Age: 53
End: 2023-12-29
Payer: COMMERCIAL

## 2023-12-29 NOTE — TELEPHONE ENCOUNTER
Caller: NEGRITO    Relationship: [facility]    Best call back number:     What is the best time to reach you:     Who are you requesting to speak with (clinical staff, provider,  specific staff member):     Do you know the name of the person who called:     What was the call regarding: NEGRITO WITH [facility] IS CALLING IN TO SEE IF DR ELKINS OR THE OFFICE HAS RECEIVED A FAX SENT ON 12/26/23  REGARDING OFFICE NOTES FOR THE PATIENT SHE CAN BE CALLED  [phone] EXT[employer]ION [phone] AND THE FAX NUMBER  [phone]    Is it okay if the provider responds through [employer]:

## 2023-12-29 NOTE — TELEPHONE ENCOUNTER
I tried calling back to let Aury know that last office note has been faxed. I was on hold for more then 20 minutes and call was disconnected.    Hub to relay

## 2024-03-25 ENCOUNTER — OFFICE VISIT (OUTPATIENT)
Dept: FAMILY MEDICINE CLINIC | Facility: CLINIC | Age: 54
End: 2024-03-25
Payer: COMMERCIAL

## 2024-03-25 VITALS
HEART RATE: 111 BPM | HEIGHT: 63 IN | OXYGEN SATURATION: 98 % | SYSTOLIC BLOOD PRESSURE: 115 MMHG | BODY MASS INDEX: 30.65 KG/M2 | DIASTOLIC BLOOD PRESSURE: 80 MMHG | WEIGHT: 173 LBS

## 2024-03-25 DIAGNOSIS — F41.1 GAD (GENERALIZED ANXIETY DISORDER): ICD-10-CM

## 2024-03-25 DIAGNOSIS — I10 PRIMARY HYPERTENSION: ICD-10-CM

## 2024-03-25 DIAGNOSIS — E03.9 ADULT HYPOTHYROIDISM: ICD-10-CM

## 2024-03-25 DIAGNOSIS — F41.0 PANIC DISORDER: ICD-10-CM

## 2024-03-25 DIAGNOSIS — A08.4 VIRAL GASTROENTERITIS: Primary | ICD-10-CM

## 2024-03-25 DIAGNOSIS — M79.10 MYALGIA: ICD-10-CM

## 2024-03-25 PROCEDURE — 99214 OFFICE O/P EST MOD 30 MIN: CPT | Performed by: FAMILY MEDICINE

## 2024-03-25 RX ORDER — ONDANSETRON 4 MG/1
4 TABLET, ORALLY DISINTEGRATING ORAL EVERY 8 HOURS PRN
Qty: 24 TABLET | Refills: 0 | Status: SHIPPED | OUTPATIENT
Start: 2024-03-25

## 2024-03-25 RX ORDER — SPIRONOLACTONE 25 MG/1
12.5 TABLET ORAL DAILY
COMMUNITY
Start: 2024-01-22

## 2024-03-25 RX ORDER — LOPERAMIDE HYDROCHLORIDE 2 MG/1
2 CAPSULE ORAL 4 TIMES DAILY PRN
Qty: 24 CAPSULE | Refills: 0 | Status: SHIPPED | OUTPATIENT
Start: 2024-03-25

## 2024-03-25 RX ORDER — AMLODIPINE BESYLATE AND BENAZEPRIL HYDROCHLORIDE 5; 20 MG/1; MG/1
1 CAPSULE ORAL DAILY
Qty: 90 CAPSULE | Refills: 1 | Status: SHIPPED | OUTPATIENT
Start: 2024-03-25

## 2024-03-25 RX ORDER — HYDROXYZINE HYDROCHLORIDE 10 MG/1
10 TABLET, FILM COATED ORAL 3 TIMES DAILY PRN
COMMUNITY
Start: 2023-12-27 | End: 2024-03-25

## 2024-03-25 NOTE — PROGRESS NOTES
Subjective   Liz Salvador is a 53 y.o. female. Presents today for   Chief Complaint   Patient presents with    Anxiety    Hypothyroidism    Hypertension       Anxiety  Patient reports no chest pain, palpitations or shortness of breath.       Hypothyroidism  Pertinent negatives include no chest pain.   Hypertension  Associated symptoms include anxiety. Pertinent negatives include no chest pain, palpitations or shortness of breath.     Patient 54 y/o female with htn, hypothyroidism and linwood;  BP doing better;  yesterday developed vomiting and diarrhea;  better today;  family getting as well.   Working on weight loss, down 8 more lbs.     Review of Systems   Respiratory:  Negative for shortness of breath.    Cardiovascular:  Negative for chest pain and palpitations.       Patient Active Problem List   Diagnosis    Adult hypothyroidism    Vitamin D deficiency    Mixed hyperlipidemia    Prehypertension    Clinical diagnosis of COVID-19       Social History     Socioeconomic History    Marital status:    Tobacco Use    Smoking status: Never    Smokeless tobacco: Never   Vaping Use    Vaping status: Never Used   Substance and Sexual Activity    Alcohol use: No    Drug use: No    Sexual activity: Defer       Allergies   Allergen Reactions    Propranolol Other (See Comments)     Bradycardia       Current Outpatient Medications on File Prior to Visit   Medication Sig Dispense Refill    amLODIPine-benazepril (Lotrel) 10-20 MG per capsule Take 1 capsule by mouth Daily. 90 capsule 1    cloNIDine (CATAPRES) 0.1 MG tablet TAKE 1 TABLET BY MOUTH TWICE DAILY AS NEEDED IF SYSTOLIC BLOOD PRESSURE>150 AND DIASTOLIC BLOOD PRESSURE> 100 180 tablet 1    levothyroxine (SYNTHROID, LEVOTHROID) 112 MCG tablet TAKE 1 TABLET BY MOUTH DAILY 30 tablet 5    LORazepam (Ativan) 1 MG tablet 11/2 to 1 daily prn panic attacks 15 tablet 2    multivitamin (MULTI-VITAMIN DAILY PO) Take  by mouth Daily.      sertraline (Zoloft) 50 MG tablet  "Take 1 tablet by mouth Daily. 90 tablet 3    spironolactone (ALDACTONE) 25 MG tablet Take 0.5 tablets by mouth Daily.      vitamin D3 125 MCG (5000 UT) capsule capsule Take 1 capsule by mouth Daily. 90 capsule 3    fluticasone (FLONASE) 50 MCG/ACT nasal spray 2 sprays into the nostril(s) as directed by provider Daily. (Patient not taking: Reported on 3/25/2024)      hydrOXYzine (ATARAX) 10 MG tablet Take 1 tablet by mouth 3 (Three) Times a Day As Needed. for anxiety (Patient not taking: Reported on 3/25/2024)       No current facility-administered medications on file prior to visit.       Objective   Vitals:    03/25/24 1431   BP: 122/80   Pulse: 111   SpO2: 98%   Weight: 78.5 kg (173 lb)   Height: 160 cm (63\")     Body mass index is 30.65 kg/m².  Weight Weight (kg) Weight (lbs) Weight Method Visit Report                 3/25/2024 78.472 kg 173 lb - Report   12/26/2023 82.101 kg 181 lb - Report   11/28/2023 83.19 kg 183 lb 6.4 oz - Report   11/13/2023 83.915 kg 185 lb - Report   10/30/2023 85.73 kg 189 lb - Report   10/13/2023 86.637 kg 191 lb - Report   7/24/2023 90.447 kg 199 lb 6.4 oz - Report   6/6/2023 93.441 kg 206 lb - Report   5/30/2023 96.163 kg 212 lb - Report   5/16/2023 94.802 kg 209 lb - Report   4/25/2023 97.183 kg 214 lb 4 oz Standing scale -     Physical Exam  Vitals and nursing note reviewed.   Constitutional:       Appearance: Normal appearance. She is not toxic-appearing or diaphoretic.   HENT:      Head: Normocephalic and atraumatic.   Musculoskeletal:      Cervical back: Neck supple.   Skin:     General: Skin is warm and dry.      Capillary Refill: Capillary refill takes less than 2 seconds.   Neurological:      Mental Status: She is alert.   Psychiatric:         Mood and Affect: Mood normal.         Behavior: Behavior normal.         Assessment & Plan   Diagnoses and all orders for this visit:    1. Viral gastroenteritis (Primary)  -     ondansetron ODT (ZOFRAN-ODT) 4 MG disintegrating tablet; " Place 1 tablet on the tongue Every 8 (Eight) Hours As Needed for Nausea or Vomiting.  Dispense: 24 tablet; Refill: 0  -     loperamide (IMODIUM) 2 MG capsule; Take 1 capsule by mouth 4 (Four) Times a Day As Needed for Diarrhea.  Dispense: 24 capsule; Refill: 0    2. Primary hypertension  -     amLODIPine-benazepril (Lotrel) 5-20 MG per capsule; Take 1 capsule by mouth Daily.  Dispense: 90 capsule; Refill: 1    3. Myalgia  -     Magnesium    4. Adult hypothyroidism  -     TSH    5. SANG (generalized anxiety disorder)  -     sertraline (Zoloft) 50 MG tablet; Take 0.5 tablets by mouth Daily.    6. Panic disorder  -     sertraline (Zoloft) 50 MG tablet; Take 0.5 tablets by mouth Daily.    Haivng leg swelling with occly light headed and dizzy;  dropped more weight since saw cardiology. Will cut amlodipine to 5mg as likely causing leg swelling;    Having myalgis check TSH and mag;  K recent check fine  SANG - back to normal, will cut ot 25mg daily  Suspect drop more bp meds as dropping weight consistently               -Follow up: 6 to 8 weeks and prn

## 2024-03-28 ENCOUNTER — TELEPHONE (OUTPATIENT)
Dept: FAMILY MEDICINE CLINIC | Facility: CLINIC | Age: 54
End: 2024-03-28

## 2024-03-28 RX ORDER — AMOXICILLIN 875 MG/1
875 TABLET, COATED ORAL 2 TIMES DAILY
Qty: 20 TABLET | Refills: 0 | Status: SHIPPED | OUTPATIENT
Start: 2024-03-28

## 2024-03-28 NOTE — TELEPHONE ENCOUNTER
Caller: Liz Salvador    Relationship: Self    Best call back number: [phone]     What medication are you requesting:      What are your current symptoms: DEEP COUGH,  ITCHY/SCRATCHY THROAT, EAR PAIN BOTH EARS, HEAD/CHEST CONGESTION,     How long have you been experiencing symptoms:  4 OR 5 DAYS AGO    Have you had these symptoms before:    [] Yes  [] No    Have you been treated for these symptoms before:   [] Yes  [] No    If a prescription is needed, what is your preferred pharmacy and phone number: [id number] [address] AT [facility] - [phone]  - [phone]      Additional notes: PATIENT CALLED SHE HAS DEEP COUGH, ITCHY/SCRATCHY THROAT, EAR PAIN BOTH EARS BUT COMES AND GOES, HEAD/CHEST CONGESTION.  SHE WANTS TO KNOW IF DR ELKINS WILL CALL HER SOMETHING INTO THE PHARMACY FOR HER.

## 2024-04-09 LAB
MAGNESIUM SERPL-MCNC: 2 MG/DL (ref 1.6–2.6)
TSH SERPL DL<=0.005 MIU/L-ACNC: 2.18 UIU/ML (ref 0.27–4.2)

## 2024-05-22 DIAGNOSIS — E03.9 ADULT HYPOTHYROIDISM: ICD-10-CM

## 2024-05-22 RX ORDER — LEVOTHYROXINE SODIUM 112 UG/1
112 TABLET ORAL DAILY
Qty: 30 TABLET | Refills: 5 | OUTPATIENT
Start: 2024-05-22

## 2024-05-22 RX ORDER — LEVOTHYROXINE SODIUM 112 UG/1
112 TABLET ORAL DAILY
Qty: 30 TABLET | Refills: 5 | Status: SHIPPED | OUTPATIENT
Start: 2024-05-22

## 2024-06-03 ENCOUNTER — OFFICE VISIT (OUTPATIENT)
Dept: FAMILY MEDICINE CLINIC | Facility: CLINIC | Age: 54
End: 2024-06-03
Payer: COMMERCIAL

## 2024-06-03 VITALS
OXYGEN SATURATION: 98 % | SYSTOLIC BLOOD PRESSURE: 112 MMHG | HEIGHT: 63 IN | HEART RATE: 76 BPM | DIASTOLIC BLOOD PRESSURE: 70 MMHG | BODY MASS INDEX: 30.83 KG/M2 | WEIGHT: 174 LBS

## 2024-06-03 DIAGNOSIS — R42 VERTIGO: ICD-10-CM

## 2024-06-03 DIAGNOSIS — F41.1 GAD (GENERALIZED ANXIETY DISORDER): ICD-10-CM

## 2024-06-03 DIAGNOSIS — H93.13 TINNITUS OF BOTH EARS: ICD-10-CM

## 2024-06-03 DIAGNOSIS — I10 PRIMARY HYPERTENSION: Primary | ICD-10-CM

## 2024-06-03 DIAGNOSIS — E03.9 ADULT HYPOTHYROIDISM: ICD-10-CM

## 2024-06-03 PROCEDURE — 99214 OFFICE O/P EST MOD 30 MIN: CPT | Performed by: FAMILY MEDICINE

## 2024-07-01 ENCOUNTER — OFFICE VISIT (OUTPATIENT)
Dept: OBSTETRICS AND GYNECOLOGY | Facility: CLINIC | Age: 54
End: 2024-07-01
Payer: COMMERCIAL

## 2024-07-01 VITALS
SYSTOLIC BLOOD PRESSURE: 155 MMHG | DIASTOLIC BLOOD PRESSURE: 89 MMHG | BODY MASS INDEX: 32.43 KG/M2 | WEIGHT: 183 LBS | HEART RATE: 82 BPM | HEIGHT: 63 IN

## 2024-07-01 DIAGNOSIS — Z01.419 ENCOUNTER FOR GYNECOLOGICAL EXAMINATION WITHOUT ABNORMAL FINDING: Primary | ICD-10-CM

## 2024-07-01 DIAGNOSIS — N81.4 CYSTOCELE WITH UTERINE PROLAPSE: ICD-10-CM

## 2024-07-01 RX ORDER — FLUTICASONE PROPIONATE 50 MCG
2 SPRAY, SUSPENSION (ML) NASAL DAILY
COMMUNITY

## 2024-07-01 NOTE — PROGRESS NOTES
GYN Annual Exam     CC- Here for annual exam.     Liz Salvador is a 54 y.o. female who presents for annual well woman exam. She is menopausal.  She is experiencing and/or reports no bothersome menopausal symptoms.  She denies postmenopausal vaginal bleeding.  She denies abdominal pain, diarrhea, and cough.  Today, she wishes to specifically address pelvic organ prolapse symptoms.  I explained to her that current ACOG guidelines state that screening Pap smears are no longer recommended after the age of 65, nor after hysterectomy for benign reasons.  Patient is a new patient to me.  She has a history that is significant for having had 5 pregnancies and 5 vaginal deliveries.  She was noted with her prior gynecologist to have some pelvic organ prolapse but not significant enough to do anything about.  She states that she had a recent bout of bronchitis where she was coughing quite a bit and noticed vaginal fullness and protrusion of tissue in a more significant manner.  She is always been very active and works at [employer] and has always had a job where she has worked on her feet.  She has some mild stress incontinence but not anything severe.  She has had no vaginal bleeding.    OB History    No obstetric history on file.         Current contraception: post menopausal status  History of abnormal Pap smear: no  Family history of uterine, colon or ovarian cancer: no  History of abnormal mammogram: no  Family history of breast cancer: no  Last Pap : 2017  Last mammogram: 2017  Last colonoscopy: Unknown  Last DEXA: Not yet done      Past Medical History:   Diagnosis Date    Fibrocystic disease of breast     GERD (gastroesophageal reflux disease)     Hyperlipidemia     Hypothyroidism     Hypothyroidism     Mitral valve prolapse     Obesity     Plantar fasciitis     Sebaceous cyst        Past Surgical History:   Procedure Laterality Date    BREAST SURGERY      CHOLECYSTECTOMY           Current Outpatient Medications:      "amLODIPine-benazepril (Lotrel) 5-20 MG per capsule, Take 1 capsule by mouth Daily., Disp: 90 capsule, Rfl: 1    fluticasone (FLONASE) 50 MCG/ACT nasal spray, 2 sprays into the nostril(s) as directed by provider Daily., Disp: , Rfl:     levothyroxine (SYNTHROID, LEVOTHROID) 112 MCG tablet, TAKE 1 TABLET BY MOUTH DAILY, Disp: 30 tablet, Rfl: 5    multivitamin (MULTI-VITAMIN DAILY PO), Take  by mouth Daily., Disp: , Rfl:     vitamin D3 125 MCG (5000 UT) capsule capsule, Take 1 capsule by mouth Daily., Disp: 90 capsule, Rfl: 3    LORazepam (Ativan) 1 MG tablet, 11/2 to 1 daily prn panic attacks (Patient not taking: Reported on 7/1/2024), Disp: 15 tablet, Rfl: 2    Allergies   Allergen Reactions    Propranolol Other (See Comments)     Bradycardia       Social History     Tobacco Use    Smoking status: Never    Smokeless tobacco: Never   Vaping Use    Vaping status: Never Used   Substance Use Topics    Alcohol use: No    Drug use: No       Family History   Problem Relation Age of Onset    Anxiety disorder Mother     Hypertension Mother     Thyroid disease Mother     Thyroid disease Father     Diverticulitis Father     Thyroid disease Brother     No Known Problems Maternal Grandmother     No Known Problems Maternal Grandfather     No Known Problems Paternal Grandmother     No Known Problems Paternal Grandfather        Review of Systems   Constitutional:  Negative for fatigue and fever.   Genitourinary:  Positive for pelvic pressure. Negative for pelvic pain and vaginal bleeding.       /89   Pulse 82   Ht 160 cm (62.99\")   Wt 83 kg (183 lb)   LMP 02/14/2016   BMI 32.43 kg/m²     Physical Exam  Constitutional:       Appearance: She is normal weight.   Genitourinary:      Bladder and urethral meatus normal.      No lesions in the vagina.      Right Labia: No lesions.     Left Labia: No lesions.     No vaginal discharge, tenderness or bleeding.      Anterior and posterior vaginal prolapse present.     Mild vaginal " atrophy present.     Vaginal exam comments: Valsalva exam reveals cystocele to the vaginal introitus and uterine prolapse to within 1 to 2 cm of the introitus.  There is perineal body defect and rectocele noted as well..        Right Adnexa: not tender, not full and no mass present.     Left Adnexa: not tender, not full and no mass present.     Cervix is parous.      No cervical motion tenderness, discharge, friability or lesion.      Uterus is not enlarged, fixed or tender.      No uterine mass detected.     Uterus is midaxial.   Abdominal:      General: Abdomen is flat.      Palpations: Abdomen is soft. There is no mass.      Tenderness: There is no abdominal tenderness.   Neurological:      Mental Status: She is alert.   Vitals reviewed.             Assessment     1) GYN annual well woman exam.   2) pelvic organ prolapse with predominant cystocele and uterine prolapse.  Long discussion with patient regarding the progression of her symptoms and different options.  There is nothing emergent about her degree of prolapse and I reassured her about this.  We discussed the causal relationship of 5 previous vaginal deliveries and being postmenopausal and physical activity and gravities role and progression of pelvic organ prolapse.  I discussed options of expectant management versus vaginal pessary versus pelvic floor reconstructive surgery and referral to urogynecologist for further discussion.  She is interested and referral.     Plan     1) Breast Health - Clinical breast exam & mammogram reviewed specifically American [employer] Society recommendations for screening specific to her, and Self breast awareness monthly  2) Pap -done today  3) Smoking status-negative  4) Colon health - screening colonoscopy recommended if not up to date  5) Bone health - Weight bearing exercise, dietary calcium recommendations and vitamin D reviewed.   6) Encouraged to be wary of information obtained via social media and internet based on  source and search.   7) Follow up prn and one year      Gurpreet Juan MD   7/1/2024  13:20 EDT

## 2024-07-08 LAB
CYTOLOGIST CVX/VAG CYTO: NORMAL
CYTOLOGY CVX/VAG DOC CYTO: NORMAL
CYTOLOGY CVX/VAG DOC THIN PREP: NORMAL
DX ICD CODE: NORMAL
HPV I/H RISK 4 DNA CVX QL PROBE+SIG AMP: NEGATIVE
Lab: NORMAL
Lab: NORMAL
OTHER STN SPEC: NORMAL
STAT OF ADQ CVX/VAG CYTO-IMP: NORMAL

## 2024-07-17 ENCOUNTER — PATIENT MESSAGE (OUTPATIENT)
Dept: OBSTETRICS AND GYNECOLOGY | Facility: CLINIC | Age: 54
End: 2024-07-17
Payer: COMMERCIAL

## 2024-07-17 ENCOUNTER — PATIENT ROUNDING (BHMG ONLY) (OUTPATIENT)
Dept: OBSTETRICS AND GYNECOLOGY | Facility: CLINIC | Age: 54
End: 2024-07-17
Payer: COMMERCIAL

## 2024-07-17 NOTE — PROGRESS NOTES
My chart message has been sent to the patient for PATIENT ROUNDING with [facility].

## 2024-09-05 ENCOUNTER — TELEPHONE (OUTPATIENT)
Dept: FAMILY MEDICINE CLINIC | Facility: CLINIC | Age: 54
End: 2024-09-05
Payer: COMMERCIAL

## 2024-09-05 RX ORDER — AMOXICILLIN 875 MG
875 TABLET ORAL 2 TIMES DAILY
Qty: 20 TABLET | Refills: 0 | Status: SHIPPED | OUTPATIENT
Start: 2024-09-05

## 2024-09-05 NOTE — TELEPHONE ENCOUNTER
Caller: Liz Salvador    Relationship: Self    Best call back number:[phone]    What medication are you requesting: ANTIBIOTIC    What are your current symptoms:  PATIENT SAYS THAT SHE HAS A COUGH THAT IS GETTING WORSE.  PATIENT SAYS SHE FEELS WARM TO THE TOUCH.    How long have you been experiencing symptoms: A FEW DAYS    Have you had these symptoms before:    [] Yes  [] No    Have you been treated for these symptoms before:   [] Yes  [] No    If a prescription is needed, what is your preferred pharmacy and phone number:    [facility]  [address] [facility]  [phone]    Additional notes: PATIENT IS CALLING IN TO ASK IF DR ELKINS WILL CALL HER IN A ANTIBIOTIC FOR THE ONGOING COUGH.

## 2024-10-14 ENCOUNTER — TELEPHONE (OUTPATIENT)
Dept: FAMILY MEDICINE CLINIC | Facility: CLINIC | Age: 54
End: 2024-10-14

## 2024-10-14 NOTE — TELEPHONE ENCOUNTER
Caller: Liz Salvador    Relationship: Self    Best call back number: [phone]    What medication are you requesting: RECOMMENDED MEDICATION    What are your current symptoms: SORE THROAT,COUGH,CONGESTION,STUFFY/RUNNY NOSE,BOTH EARS HURT OCCASIONALLY,GLANDS FEEL SWOLLEN    How long have you been experiencing symptoms: 10/11/24    Have you had these symptoms before:    [] Yes  [] No    Have you been treated for these symptoms before:   [] Yes  [] No    If a prescription is needed, what is your preferred pharmacy and phone number: [id number] [place], [place] - [address] AT [facility] - [phone] [facility] [phone]      Additional notes:PATIENT STATED THAT SHE HAS BEEN EXPERIENCING THE ABOVE SYMPTOMS SINCE 10/11/24 AND IS WANTING TO KNOW IF THERE IS ANYTHING DR ELKINS CAN CALL IN FOR HER. PATIENT STATED THAT SHE HAS ONLY TAKEN TYLENOL SO FAT BUT IT HAS NOT HELPED/ PLEASE ADVISE.

## 2024-10-15 DIAGNOSIS — H93.8X3 CONGESTION OF BOTH EARS: ICD-10-CM

## 2024-10-15 DIAGNOSIS — R05.1 ACUTE COUGH: Primary | ICD-10-CM

## 2024-10-15 RX ORDER — DEXTROMETHORPHAN HYDROBROMIDE AND PROMETHAZINE HYDROCHLORIDE 15; 6.25 MG/5ML; MG/5ML
5 SYRUP ORAL
Qty: 180 ML | Refills: 0 | Status: SHIPPED | OUTPATIENT
Start: 2024-10-15

## 2024-10-15 RX ORDER — DEXTROMETHORPHAN POLISTIREX 30 MG/5ML
60 SUSPENSION ORAL EVERY 12 HOURS SCHEDULED
Qty: 280 ML | Refills: 0 | Status: SHIPPED | OUTPATIENT
Start: 2024-10-15

## 2024-10-15 RX ORDER — GUAIFENESIN 600 MG/1
1200 TABLET, EXTENDED RELEASE ORAL 2 TIMES DAILY
Qty: 20 TABLET | Refills: 0 | Status: SHIPPED | OUTPATIENT
Start: 2024-10-15

## 2024-10-17 ENCOUNTER — TELEPHONE (OUTPATIENT)
Dept: FAMILY MEDICINE CLINIC | Facility: CLINIC | Age: 54
End: 2024-10-17
Payer: COMMERCIAL

## 2024-10-17 RX ORDER — TOBRAMYCIN 3 MG/ML
2 SOLUTION/ DROPS OPHTHALMIC EVERY 6 HOURS SCHEDULED
Qty: 5 ML | Refills: 0 | Status: SHIPPED | OUTPATIENT
Start: 2024-10-17

## 2024-10-17 NOTE — TELEPHONE ENCOUNTER
Pt thinks she has pink eye. She woke up this morning with redness, crusty eyelids. Please send med or advise.

## 2024-10-18 ENCOUNTER — TELEPHONE (OUTPATIENT)
Dept: FAMILY MEDICINE CLINIC | Facility: CLINIC | Age: 54
End: 2024-10-18

## 2024-10-18 ENCOUNTER — OFFICE VISIT (OUTPATIENT)
Dept: FAMILY MEDICINE CLINIC | Facility: CLINIC | Age: 54
End: 2024-10-18
Payer: COMMERCIAL

## 2024-10-18 VITALS
OXYGEN SATURATION: 99 % | WEIGHT: 181.6 LBS | SYSTOLIC BLOOD PRESSURE: 144 MMHG | HEART RATE: 94 BPM | BODY MASS INDEX: 32.18 KG/M2 | DIASTOLIC BLOOD PRESSURE: 82 MMHG | TEMPERATURE: 98.3 F | HEIGHT: 63 IN

## 2024-10-18 DIAGNOSIS — B30.9 VIRAL CONJUNCTIVITIS OF BOTH EYES: Primary | ICD-10-CM

## 2024-10-18 PROCEDURE — 99213 OFFICE O/P EST LOW 20 MIN: CPT

## 2024-10-18 NOTE — PROGRESS NOTES
Subjective   Liz Salvador is a 54 y.o. female.     Chief Complaint   Patient presents with    URI     Cough lani Sorethat  Cough Med called in 2 days ago    Eye Drainage     Eye drops prescribed yesterday     History of Present Illness  The patient is a 54-year-old female who presents for evaluation of a sore throat.    Viral conjunctivitis: She began experiencing a sore throat last Friday while at work, accompanied by a general feeling of malaise. Despite resting over the weekend, her condition did not improve. She was prescribed Mucinex and cough medicine, which she has been taking. Yesterday, she woke up feeling unwell and noticed symptoms of pink eye in both eyes. She started using the prescribed eye drops yesterday but continues to experience a sore throat and swollen glands. She suspects she may have tonsillitis or strep throat, as she has had similar symptoms in the past. She believes she may need antibiotics. Currently, she feels well except for the persistent tickle in her throat and excessive mucus production. She denies any breathing difficulties but reports a racing heart, which she attributes to being sick. She experiences intermittent ear pain, particularly in the mornings after sleeping on one side. She also has large tonsils and occasionally gets tonsil stones.    She recently completed a course of amoxicillin for a cough, which she contracted from her grandchildren. Her previous symptoms included coughing, sneezing, and a runny nose, but no sore throat. She lives in a multi-generational household with five children who are frequently ill.    She has been on blood pressure medication for a year, which she believes may be causing the phlegm in her throat. She also reports that her blood pressure has been unstable, leading to emotional breakdowns and severe anxiety. She was previously overmedicated for her thyroid, which caused heart palpitations. After seeing a cardiologist, it was determined  that her heart was fine and her symptoms were due to anxiety.    She has lost weight, dropping from over 200 pounds, but is now regaining weight due to increased appetite. She has been unable to exercise due to her persistent cough. She has an appointment with an ENT specialist scheduled for December 2024.     The following portions of the patient's history were reviewed and updated as appropriate: allergies, current medications, past family history, past medical history, past social history, past surgical history and problem list.    Results         Objective     Vitals:    10/18/24 1702   BP: 144/82   Pulse: 94   Temp: 98.3 °F (36.8 °C)   SpO2: 99%      Body mass index is 32.18 kg/m².    Physical Exam  Vitals reviewed.   Constitutional:       General: She is not in acute distress.     Appearance: Normal appearance. She is obese. She is not ill-appearing or toxic-appearing.   HENT:      Right Ear: Tympanic membrane, ear canal and external ear normal. There is no impacted cerumen.      Left Ear: Tympanic membrane, ear canal and external ear normal. There is no impacted cerumen.      Nose: No congestion or rhinorrhea.      Mouth/Throat:      Mouth: Mucous membranes are moist.      Pharynx: Oropharynx is clear. Posterior oropharyngeal erythema present.      Comments: Very large tonsils (bilateral) baseline for her  Eyes:      General: Lids are normal. Vision grossly intact.         Right eye: No discharge.         Left eye: No discharge.      Conjunctiva/sclera: Conjunctivae normal.      Comments: Bilateral eyeball erythema, right worse than left. No evidence of discharge.    Cardiovascular:      Rate and Rhythm: Normal rate.   Pulmonary:      Effort: Pulmonary effort is normal. No respiratory distress.      Breath sounds: Normal breath sounds.   Lymphadenopathy:      Cervical: No cervical adenopathy.   Skin:     General: Skin is warm and dry.      Capillary Refill: Capillary refill takes less than 2 seconds.    Neurological:      General: No focal deficit present.      Mental Status: She is alert and oriented to person, place, and time.      Motor: No weakness.   Psychiatric:         Behavior: Behavior normal.       Assessment & Plan  Viral conjunctivitis   The patient reports a sore throat with a persistent tickle and swollen glands. She also mentions a history of tonsillitis and strep throat. A strep test will be conducted today to rule out streptococcal pharyngitis.    The patient has been experiencing pinkeye in both eyes and started using prescribed eye drops yesterday. She reports that her eyes are still red but feels the drops are starting to work. She will continue using the eye drops as prescribed.    3. Chronic cough.  The patient has a chronic cough and phlegm, which she associates with her blood pressure medication (amlodipine, benazepril combo). She has an ENT appointment scheduled for December to evaluate the cough and consider a medication change.    4. Hypertension.  The patient is currently on a combination of amlodipine and benazepril (Lotrel) for hypertension. She reports that her blood pressure was previously out of control but is now managed with this medication. She will continue her current medication regimen.    Discussed Care Gaps, ordered referrals and encouraged vaccination updates.       - Pt agrees with plan of care and denies further questions/concerns today  - This document is intended for medical expert use only. Persons  reading this document without medical staff guidance may result in misinterpretation and unintended morbidity     Go to the ER for any possible life-threatening symptoms such as chest pain or shortness of air.      Please allow 3-5 business days for recommendations based on new results      I personally spent time with this patient, preparing for the visit, reviewing tests, obtaining and/or reviewing a separately obtained history, performing a medically appropriate  examination and/or evaluation, counseling and educating the patient/family/caregiver, ordering medications,  documenting information in the medical record and indepentently interpreting results.       Patient or patient representative verbalized consent for the use of Ambient Listening during the visit with  MARLA Walker for chart documentation. 10/18/2024  18:43 EDT

## 2024-10-18 NOTE — TELEPHONE ENCOUNTER
Caller: Liz Salvador    Relationship to patient: Self    Best call back number: [phone]     Chief complaint: COUGH, SORE THROAT, PINK EYE IN BOTH EYES, EXHAUSTION, COUGHING UP YELLOW PHLEGM     Type of visit: SAME DAY     Requested date: TODAY     Additional notes: HUB ATTEMPTED TO SCHEDULE, BUT THERE WAS NO AVAILABILITY WITH DR. ELKINS. PATIENT WOULD PREFER NOT TO SEE A NURSE PRACTITIONER AND IS ASKING IF SHE CAN BE WORKED IN WITH DR. ELKINS. PLEASE ADVISE.

## 2024-10-18 NOTE — PATIENT INSTRUCTIONS

## 2024-11-17 DIAGNOSIS — E03.9 ADULT HYPOTHYROIDISM: ICD-10-CM

## 2024-11-18 RX ORDER — LEVOTHYROXINE SODIUM 112 UG/1
112 TABLET ORAL DAILY
Qty: 30 TABLET | Refills: 5 | Status: SHIPPED | OUTPATIENT
Start: 2024-11-18